# Patient Record
Sex: FEMALE | Race: BLACK OR AFRICAN AMERICAN | URBAN - METROPOLITAN AREA
[De-identification: names, ages, dates, MRNs, and addresses within clinical notes are randomized per-mention and may not be internally consistent; named-entity substitution may affect disease eponyms.]

---

## 2020-07-25 ENCOUNTER — TELEPHONE ENCOUNTER (OUTPATIENT)
Dept: URBAN - METROPOLITAN AREA CLINIC 13 | Facility: CLINIC | Age: 75
End: 2020-07-25

## 2020-07-25 RX ORDER — ALPRAZOLAM 1 MG/1
TAKE 1 TABLET 3 TIMES DAILY AS NEEDED TABLET ORAL
Refills: 0 | OUTPATIENT
Start: 2018-06-20 | End: 2019-12-02

## 2020-07-25 RX ORDER — COLCHICINE 0.6 MG/1
USE AS DIRECTED TABLET, FILM COATED ORAL
Refills: 0 | OUTPATIENT
Start: 2018-06-20 | End: 2019-12-02

## 2020-07-25 RX ORDER — OXYCODONE HYDROCHLORIDE 10 MG/1
TAKE TABLET  PRN TABLET ORAL
Refills: 0 | OUTPATIENT
Start: 2018-06-20 | End: 2020-03-04

## 2020-07-25 RX ORDER — CHOLESTYRAMINE 4 G/9G
TAKE 1 PACKET BY MOUTH ONCE DAILY AS DIRECTED POWDER, FOR SUSPENSION ORAL
Refills: 2 | OUTPATIENT
Start: 2018-06-20 | End: 2019-05-29

## 2020-07-25 RX ORDER — SUCRALFATE 1 G/1
DISSOLVE 1 TABLET IN 1-2 TABLESPOONS OF WATER AND TAKE 30 MINUTES AFTER MEALS AND AT BEDTIME TABLET ORAL
Qty: 120 | Refills: 0 | OUTPATIENT
End: 2019-12-02

## 2020-07-25 RX ORDER — LINACLOTIDE 145 UG/1
CAPSULE, GELATIN COATED ORAL
Refills: 0 | OUTPATIENT
End: 2019-12-02

## 2020-07-26 ENCOUNTER — TELEPHONE ENCOUNTER (OUTPATIENT)
Dept: URBAN - METROPOLITAN AREA CLINIC 13 | Facility: CLINIC | Age: 75
End: 2020-07-26

## 2020-07-26 RX ORDER — HYDROCODONE BITARTRATE AND ACETAMINOPHEN 10; 325 MG/1; MG/1
TABLET ORAL
Qty: 30 | Refills: 0 | Status: ACTIVE | COMMUNITY
Start: 2019-06-26

## 2020-07-26 RX ORDER — ALPRAZOLAM 1 MG/1
TABLET ORAL
Qty: 45 | Refills: 0 | Status: ACTIVE | COMMUNITY
Start: 2018-12-05

## 2020-07-26 RX ORDER — BRIMONIDINE TARTRATE 1 MG/ML
SOLUTION/ DROPS OPHTHALMIC
Qty: 10 | Refills: 0 | Status: ACTIVE | COMMUNITY
Start: 2019-07-18

## 2020-07-26 RX ORDER — LATANOPROST/PF 0.005 %
DROPS OPHTHALMIC (EYE)
Qty: 2 | Refills: 0 | Status: ACTIVE | COMMUNITY
Start: 2019-09-28

## 2020-07-26 RX ORDER — AZITHROMYCIN DIHYDRATE 250 MG/1
TABLET, FILM COATED ORAL
Qty: 6 | Refills: 0 | Status: ACTIVE | COMMUNITY
Start: 2018-11-14

## 2020-07-26 RX ORDER — ROSUVASTATIN CALCIUM 10 MG
TAKE 1 TABLET DAILY TABLET ORAL
Refills: 0 | Status: ACTIVE | COMMUNITY

## 2020-07-26 RX ORDER — FEBUXOSTAT 40 MG/1
TAKE 1 TABLET DAILY TABLET ORAL
Refills: 0 | Status: ACTIVE | COMMUNITY
Start: 2018-06-20

## 2020-07-26 RX ORDER — TRAMADOL HYDROCHLORIDE 50 MG/1
TABLET ORAL
Qty: 7 | Refills: 0 | Status: ACTIVE | COMMUNITY
Start: 2018-11-14

## 2020-07-26 RX ORDER — HYDROCODONE BITARTRATE AND ACETAMINOPHEN 5; 325 MG/1; MG/1
TABLET ORAL
Qty: 10 | Refills: 0 | Status: ACTIVE | COMMUNITY
Start: 2018-11-06

## 2020-07-26 RX ORDER — LATANOPROST/PF 0.005 %
DROPS OPHTHALMIC (EYE)
Qty: 2 | Refills: 0 | Status: ACTIVE | COMMUNITY
Start: 2019-07-17

## 2020-07-26 RX ORDER — TRIAMTERENE AND HYDROCHLOROTHIAZIDE 37.5; 25 MG/1; MG/1
CAPSULE ORAL
Qty: 90 | Refills: 0 | Status: ACTIVE | COMMUNITY
Start: 2019-04-12

## 2020-07-26 RX ORDER — TRIAMTERENE AND HYDROCHLOROTHIAZIDE 37.5; 25 MG/1; MG/1
TAKE 1 TABLET DAILY TABLET ORAL
Refills: 0 | Status: ACTIVE | COMMUNITY
Start: 2018-06-20

## 2020-07-26 RX ORDER — DIPHENHYDRAMINE HYDROCHLORIDE 25 MG/1
TAKE WITH THE OXYCODONE TO ASSIST WITH THE ALLERGIC REACTION TABLET, FILM COATED ORAL
Refills: 0 | Status: ACTIVE | COMMUNITY

## 2020-07-26 RX ORDER — DULOXETINE 30 MG/1
CAPSULE, DELAYED RELEASE PELLETS ORAL
Qty: 45 | Refills: 0 | Status: ACTIVE | COMMUNITY
Start: 2018-12-05

## 2020-07-26 RX ORDER — ROSUVASTATIN CALCIUM 20 MG/1
TABLET, FILM COATED ORAL
Qty: 90 | Refills: 0 | Status: ACTIVE | COMMUNITY
Start: 2019-08-22

## 2020-07-26 RX ORDER — FLUTICASONE PROPIONATE 50 UG/1
SPRAY, METERED NASAL
Qty: 16 | Refills: 0 | Status: ACTIVE | COMMUNITY
Start: 2019-04-10

## 2020-07-26 RX ORDER — TIZANIDINE 4 MG/1
TABLET ORAL
Qty: 180 | Refills: 0 | Status: ACTIVE | COMMUNITY
Start: 2019-10-28

## 2020-07-26 RX ORDER — PREDNISOLONE ACETATE 10 MG/ML
SUSPENSION/ DROPS OPHTHALMIC
Qty: 5 | Refills: 0 | Status: ACTIVE | COMMUNITY
Start: 2019-12-13

## 2020-07-26 RX ORDER — BEPOTASTINE BESILATE 15 MG/ML
SOLUTION/ DROPS OPHTHALMIC
Qty: 10 | Refills: 0 | Status: ACTIVE | COMMUNITY
Start: 2019-09-28

## 2020-07-26 RX ORDER — ALBUTEROL SULFATE 90 UG/1
AEROSOL, METERED RESPIRATORY (INHALATION)
Qty: 36 | Refills: 0 | Status: ACTIVE | COMMUNITY
Start: 2018-11-14

## 2020-07-26 RX ORDER — TIZANIDINE HYDROCHLORIDE 6 MG/1
CAPSULE ORAL
Qty: 15 | Refills: 0 | Status: ACTIVE | COMMUNITY
Start: 2019-02-19

## 2020-07-26 RX ORDER — BUSPIRONE HYDROCHLORIDE 10 MG/1
TABLET ORAL
Qty: 60 | Refills: 0 | Status: ACTIVE | COMMUNITY
Start: 2019-05-22

## 2020-07-26 RX ORDER — COLCHICINE 0.6 MG/1
TABLET, FILM COATED ORAL
Qty: 30 | Refills: 0 | Status: ACTIVE | COMMUNITY
Start: 2018-06-20

## 2020-07-26 RX ORDER — METOPROLOL TARTRATE 25 MG/1
TABLET, FILM COATED ORAL
Qty: 90 | Refills: 0 | Status: ACTIVE | COMMUNITY
Start: 2019-08-07

## 2020-07-26 RX ORDER — ESTRADIOL 1 MG/1
TABLET ORAL
Qty: 30 | Refills: 0 | Status: ACTIVE | COMMUNITY
Start: 2018-06-20

## 2020-07-26 RX ORDER — ALPRAZOLAM 1 MG/1
TABLET ORAL
Qty: 45 | Refills: 0 | Status: ACTIVE | COMMUNITY
Start: 2018-07-23

## 2020-07-26 RX ORDER — TRIAMTERENE AND HYDROCHLOROTHIAZIDE 37.5; 25 MG/1; MG/1
CAPSULE ORAL
Qty: 90 | Refills: 0 | Status: ACTIVE | COMMUNITY
Start: 2019-01-02

## 2020-07-26 RX ORDER — BUSPIRONE HYDROCHLORIDE 10 MG/1
TABLET ORAL
Qty: 60 | Refills: 0 | Status: ACTIVE | COMMUNITY
Start: 2019-08-18

## 2020-07-26 RX ORDER — TIZANIDINE 4 MG/1
TABLET ORAL
Qty: 180 | Refills: 0 | Status: ACTIVE | COMMUNITY
Start: 2018-12-05

## 2020-07-26 RX ORDER — CHOLESTYRAMINE 4 G/9G
POWDER, FOR SUSPENSION ORAL
Qty: 60 | Refills: 0 | Status: ACTIVE | COMMUNITY
Start: 2018-06-20

## 2020-07-26 RX ORDER — OLOPATADINE HYDROCHLORIDE 1 MG/ML
SOLUTION/ DROPS OPHTHALMIC
Qty: 5 | Refills: 0 | Status: ACTIVE | COMMUNITY
Start: 2020-02-03

## 2020-07-26 RX ORDER — OXYCODONE HYDROCHLORIDE 10 MG/1
TABLET ORAL
Qty: 60 | Refills: 0 | Status: ACTIVE | COMMUNITY
Start: 2019-04-04

## 2020-07-26 RX ORDER — PREDNISOLONE ACETATE 10 MG/ML
SUSPENSION/ DROPS OPHTHALMIC
Qty: 5 | Refills: 0 | Status: ACTIVE | COMMUNITY
Start: 2019-09-09

## 2020-07-26 RX ORDER — FLUTICASONE PROPIONATE 50 UG/1
USE 1 SPRAY IN EACH NOSTRIL ONCE DAILY SPRAY, METERED NASAL
Refills: 0 | Status: ACTIVE | COMMUNITY

## 2020-07-26 RX ORDER — BENZONATATE 100 MG/1
CAPSULE ORAL
Qty: 21 | Refills: 0 | Status: ACTIVE | COMMUNITY
Start: 2018-11-14

## 2020-07-26 RX ORDER — CEPHALEXIN 500 MG/1
CAPSULE ORAL
Qty: 10 | Refills: 0 | Status: ACTIVE | COMMUNITY
Start: 2018-11-06

## 2020-07-26 RX ORDER — BUDESONIDE AND FORMOTEROL FUMARATE DIHYDRATE 80; 4.5 UG/1; UG/1
AEROSOL RESPIRATORY (INHALATION)
Qty: 30 | Refills: 0 | Status: ACTIVE | COMMUNITY
Start: 2019-05-10

## 2020-07-26 RX ORDER — BACLOFEN 10 MG/1
TAKE 1 TABLET 3 TIMES DAILY TABLET ORAL
Refills: 0 | Status: ACTIVE | COMMUNITY

## 2020-07-26 RX ORDER — BUSPIRONE HYDROCHLORIDE 10 MG/1
TABLET ORAL
Qty: 60 | Refills: 0 | Status: ACTIVE | COMMUNITY
Start: 2019-07-08

## 2020-07-26 RX ORDER — FENOFIBRATE 145 MG/1
TABLET, FILM COATED ORAL
Qty: 90 | Refills: 0 | Status: ACTIVE | COMMUNITY
Start: 2018-06-25

## 2020-07-26 RX ORDER — TIOTROPIUM BROMIDE 18 UG/1
CAPSULE ORAL; RESPIRATORY (INHALATION)
Qty: 30 | Refills: 0 | Status: ACTIVE | COMMUNITY
Start: 2019-05-09

## 2020-07-26 RX ORDER — OXYCODONE HYDROCHLORIDE 5 MG/1
TABLET ORAL
Qty: 10 | Refills: 0 | Status: ACTIVE | COMMUNITY
Start: 2019-02-19

## 2020-07-26 RX ORDER — FAMOTIDINE 40 MG/1
TAKE 1 TABLET BY MOUTH TWICE A DAY 30 MINUTES BEFORE BREAKFAST AND DINNER TABLET ORAL
Qty: 60 | Refills: 11 | Status: ACTIVE | COMMUNITY
Start: 2019-12-02

## 2020-07-26 RX ORDER — TIOTROPIUM BROMIDE 18 UG/1
CAPSULE ORAL; RESPIRATORY (INHALATION)
Qty: 30 | Refills: 0 | Status: ACTIVE | COMMUNITY
Start: 2019-05-10

## 2020-07-26 RX ORDER — COLCHICINE 0.6 MG/1
TAKE 1 TABLET DAILY AS DIRECTED TABLET, FILM COATED ORAL
Refills: 0 | Status: ACTIVE | COMMUNITY

## 2020-07-26 RX ORDER — HYDROXYZINE HYDROCHLORIDE 25 MG/1
TABLET, FILM COATED ORAL
Qty: 90 | Refills: 0 | Status: ACTIVE | COMMUNITY
Start: 2018-06-20

## 2020-07-26 RX ORDER — FLUTICASONE PROPIONATE 50 UG/1
SPRAY, METERED NASAL
Qty: 16 | Refills: 0 | Status: ACTIVE | COMMUNITY
Start: 2019-01-08

## 2020-07-26 RX ORDER — OXYCODONE HYDROCHLORIDE 10 MG/1
TABLET ORAL
Qty: 30 | Refills: 0 | Status: ACTIVE | COMMUNITY
Start: 2019-01-02

## 2020-07-26 RX ORDER — BACLOFEN 10 MG/1
TABLET ORAL
Qty: 60 | Refills: 0 | Status: ACTIVE | COMMUNITY
Start: 2019-06-26

## 2020-07-26 RX ORDER — FLUCONAZOLE 200 MG/1
TABLET ORAL
Qty: 7 | Refills: 0 | Status: ACTIVE | COMMUNITY
Start: 2019-04-10

## 2020-07-26 RX ORDER — SUCRALFATE 1 G/1
TABLET ORAL
Qty: 90 | Refills: 0 | Status: ACTIVE | COMMUNITY
Start: 2018-08-01

## 2020-07-26 RX ORDER — LIDOCAINE 5 G/100G
OINTMENT TOPICAL
Qty: 35 | Refills: 0 | Status: ACTIVE | COMMUNITY
Start: 2018-06-20

## 2020-07-26 RX ORDER — COLCHICINE 0.6 MG/1
TABLET, FILM COATED ORAL
Qty: 90 | Refills: 0 | Status: ACTIVE | COMMUNITY
Start: 2019-01-02

## 2020-07-26 RX ORDER — OXYCODONE AND ACETAMINOPHEN 7.5; 325 MG/1; MG/1
TABLET ORAL
Qty: 12 | Refills: 0 | Status: ACTIVE | COMMUNITY
Start: 2018-07-23

## 2020-07-26 RX ORDER — ALBUTEROL SULFATE 90 UG/1
INHALE 1 PUFF EVERY 4 HOURS AS NEEDED AEROSOL, METERED RESPIRATORY (INHALATION)
Refills: 0 | Status: ACTIVE | COMMUNITY
Start: 2018-06-20

## 2020-07-26 RX ORDER — NITROFURANTOIN MONOHYDRATE/MACROCRYSTALLINE 25; 75 MG/1; MG/1
CAPSULE ORAL
Qty: 10 | Refills: 0 | Status: ACTIVE | COMMUNITY
Start: 2018-11-14

## 2020-07-26 RX ORDER — AMOXICILLIN AND CLAVULANATE POTASSIUM 875; 125 MG/1; MG/1
TABLET, FILM COATED ORAL
Qty: 20 | Refills: 0 | Status: ACTIVE | COMMUNITY
Start: 2018-07-23

## 2020-10-12 ENCOUNTER — OFFICE VISIT (OUTPATIENT)
Dept: URBAN - METROPOLITAN AREA CLINIC 113 | Facility: CLINIC | Age: 75
End: 2020-10-12
Payer: COMMERCIAL

## 2020-10-12 VITALS — WEIGHT: 250 LBS | RESPIRATION RATE: 18 BRPM | HEIGHT: 62 IN | BODY MASS INDEX: 46.01 KG/M2

## 2020-10-12 DIAGNOSIS — K21.9 GERD WITHOUT ESOPHAGITIS: ICD-10-CM

## 2020-10-12 DIAGNOSIS — K59.09 CHRONIC CONSTIPATION: ICD-10-CM

## 2020-10-12 DIAGNOSIS — K64.8 INTERNAL HEMORRHOID, BLEEDING: ICD-10-CM

## 2020-10-12 PROCEDURE — G8417 CALC BMI ABV UP PARAM F/U: HCPCS | Performed by: INTERNAL MEDICINE

## 2020-10-12 PROCEDURE — 1036F TOBACCO NON-USER: CPT | Performed by: INTERNAL MEDICINE

## 2020-10-12 PROCEDURE — 99213 OFFICE O/P EST LOW 20 MIN: CPT | Performed by: INTERNAL MEDICINE

## 2020-10-12 PROCEDURE — G9903 PT SCRN TBCO ID AS NON USER: HCPCS | Performed by: INTERNAL MEDICINE

## 2020-10-12 PROCEDURE — G8427 DOCREV CUR MEDS BY ELIG CLIN: HCPCS | Performed by: INTERNAL MEDICINE

## 2020-10-12 RX ORDER — DIPHENHYDRAMINE HYDROCHLORIDE 25 MG/1
TAKE WITH THE OXYCODONE TO ASSIST WITH THE ALLERGIC REACTION TABLET, FILM COATED ORAL
Refills: 0 | Status: DISCONTINUED | COMMUNITY

## 2020-10-12 RX ORDER — HYDROCODONE BITARTRATE AND ACETAMINOPHEN 10; 325 MG/1; MG/1
TABLET ORAL
Qty: 30 | Refills: 0 | Status: DISCONTINUED | COMMUNITY
Start: 2019-06-26

## 2020-10-12 RX ORDER — METOPROLOL TARTRATE 25 MG/1
TABLET, FILM COATED ORAL
Qty: 90 | Refills: 0 | Status: DISCONTINUED | COMMUNITY
Start: 2019-08-07

## 2020-10-12 RX ORDER — FENOFIBRATE 145 MG/1
TABLET, FILM COATED ORAL
Qty: 90 | Refills: 0 | Status: DISCONTINUED | COMMUNITY
Start: 2018-06-25

## 2020-10-12 RX ORDER — FLUCONAZOLE 200 MG/1
TABLET ORAL
Qty: 7 | Refills: 0 | Status: DISCONTINUED | COMMUNITY
Start: 2019-04-10

## 2020-10-12 RX ORDER — AMOXICILLIN AND CLAVULANATE POTASSIUM 875; 125 MG/1; MG/1
TABLET, FILM COATED ORAL
Qty: 20 | Refills: 0 | Status: DISCONTINUED | COMMUNITY
Start: 2018-07-23

## 2020-10-12 RX ORDER — ROSUVASTATIN CALCIUM 20 MG/1
TABLET, FILM COATED ORAL
Qty: 90 | Refills: 0 | Status: DISCONTINUED | COMMUNITY
Start: 2019-08-22

## 2020-10-12 RX ORDER — LATANOPROST/PF 0.005 %
1 DROP INTO AFFECTED EYE IN THE EVENING DROPS OPHTHALMIC (EYE) ONCE A DAY
Refills: 0 | Status: ACTIVE | COMMUNITY
Start: 2019-07-17

## 2020-10-12 RX ORDER — SUCRALFATE 1 G/1
TABLET ORAL
Qty: 90 | Refills: 0 | Status: DISCONTINUED | COMMUNITY
Start: 2018-08-01

## 2020-10-12 RX ORDER — BUDESONIDE AND FORMOTEROL FUMARATE DIHYDRATE 160; 4.5 UG/1; UG/1
2 PUFFS AEROSOL RESPIRATORY (INHALATION) TWICE A DAY
Refills: 0 | Status: ACTIVE | COMMUNITY
Start: 2019-05-10

## 2020-10-12 RX ORDER — FAMOTIDINE 40 MG/1
TAKE 1 TABLET BY MOUTH TWICE A DAY 30 MINUTES BEFORE BREAKFAST AND DINNER TABLET ORAL
Qty: 60 | Refills: 11 | Status: DISCONTINUED | COMMUNITY
Start: 2019-12-02

## 2020-10-12 RX ORDER — DULOXETINE 30 MG/1
CAPSULE, DELAYED RELEASE PELLETS ORAL
Qty: 45 | Refills: 0 | Status: DISCONTINUED | COMMUNITY
Start: 2018-12-05

## 2020-10-12 RX ORDER — ESTRADIOL 1 MG/1
TABLET ORAL
Qty: 30 | Refills: 0 | Status: DISCONTINUED | COMMUNITY
Start: 2018-06-20

## 2020-10-12 RX ORDER — TIZANIDINE HYDROCHLORIDE 6 MG/1
CAPSULE ORAL
Qty: 15 | Refills: 0 | Status: DISCONTINUED | COMMUNITY
Start: 2019-02-19

## 2020-10-12 RX ORDER — COLCHICINE 0.6 MG/1
TABLET, FILM COATED ORAL
Qty: 30 | Refills: 0 | Status: DISCONTINUED | COMMUNITY
Start: 2018-06-20

## 2020-10-12 RX ORDER — ROSUVASTATIN CALCIUM 10 MG
1 TABLET TABLET ORAL ONCE A DAY
Refills: 0 | Status: ACTIVE | COMMUNITY

## 2020-10-12 RX ORDER — ALPRAZOLAM 1 MG/1
1 TABLET TABLET ORAL AT BEDTIME
Refills: 0 | Status: ACTIVE | COMMUNITY
Start: 2018-07-23

## 2020-10-12 RX ORDER — OMEPRAZOLE 20 MG/1
1-2 CAPSULE 30 MINUTES BEFORE MORNING MEAL CAPSULE, DELAYED RELEASE ORAL ONCE A DAY
Qty: 180 | Refills: 2 | OUTPATIENT
Start: 2020-10-12

## 2020-10-12 RX ORDER — ALBUTEROL SULFATE 90 UG/1
AEROSOL, METERED RESPIRATORY (INHALATION)
Qty: 36 | Refills: 0 | Status: DISCONTINUED | COMMUNITY
Start: 2018-11-14

## 2020-10-12 RX ORDER — NITROFURANTOIN MONOHYDRATE/MACROCRYSTALLINE 25; 75 MG/1; MG/1
CAPSULE ORAL
Qty: 10 | Refills: 0 | Status: DISCONTINUED | COMMUNITY
Start: 2018-11-14

## 2020-10-12 RX ORDER — LIDOCAINE 5 G/100G
OINTMENT TOPICAL
Qty: 35 | Refills: 0 | Status: DISCONTINUED | COMMUNITY
Start: 2018-06-20

## 2020-10-12 RX ORDER — HYDROCODONE BITARTRATE AND ACETAMINOPHEN 5; 325 MG/1; MG/1
TABLET ORAL
Qty: 10 | Refills: 0 | Status: DISCONTINUED | COMMUNITY
Start: 2018-11-06

## 2020-10-12 RX ORDER — HYDROXYZINE HYDROCHLORIDE 25 MG/1
TABLET, FILM COATED ORAL
Qty: 90 | Refills: 0 | Status: DISCONTINUED | COMMUNITY
Start: 2018-06-20

## 2020-10-12 RX ORDER — TRIAMTERENE AND HYDROCHLOROTHIAZIDE 37.5; 25 MG/1; MG/1
TAKE 1 TABLET DAILY TABLET ORAL
Refills: 0 | Status: DISCONTINUED | COMMUNITY
Start: 2018-06-20

## 2020-10-12 RX ORDER — BEPOTASTINE BESILATE 15 MG/ML
SOLUTION/ DROPS OPHTHALMIC
Qty: 10 | Refills: 0 | Status: DISCONTINUED | COMMUNITY
Start: 2019-09-28

## 2020-10-12 RX ORDER — ALBUTEROL SULFATE 90 UG/1
INHALE 1 PUFF EVERY 4 HOURS AS NEEDED AEROSOL, METERED RESPIRATORY (INHALATION)
Refills: 0 | Status: ACTIVE | COMMUNITY
Start: 2018-06-20

## 2020-10-12 RX ORDER — BRIMONIDINE TARTRATE 1 MG/ML
SOLUTION/ DROPS OPHTHALMIC
Qty: 10 | Refills: 0 | Status: DISCONTINUED | COMMUNITY
Start: 2019-07-18

## 2020-10-12 RX ORDER — COLCHICINE 0.6 MG/1
1 TABLET TABLET, FILM COATED ORAL ONCE A DAY
Refills: 0 | Status: ACTIVE | COMMUNITY

## 2020-10-12 RX ORDER — TRIAMTERENE AND HYDROCHLOROTHIAZIDE 37.5; 25 MG/1; MG/1
CAPSULE ORAL
Qty: 90 | Refills: 0 | Status: DISCONTINUED | COMMUNITY
Start: 2019-01-02

## 2020-10-12 RX ORDER — PREDNISOLONE ACETATE 10 MG/ML
SUSPENSION/ DROPS OPHTHALMIC
Qty: 5 | Refills: 0 | Status: DISCONTINUED | COMMUNITY
Start: 2019-09-09

## 2020-10-12 RX ORDER — OXYCODONE HYDROCHLORIDE 10 MG/1
TABLET ORAL
Qty: 30 | Refills: 0 | Status: DISCONTINUED | COMMUNITY
Start: 2019-01-02

## 2020-10-12 RX ORDER — OXYCODONE HYDROCHLORIDE 5 MG/1
TABLET ORAL
Qty: 10 | Refills: 0 | Status: DISCONTINUED | COMMUNITY
Start: 2019-02-19

## 2020-10-12 RX ORDER — BUSPIRONE HYDROCHLORIDE 10 MG/1
1 TABLET TABLET ORAL TWICE A DAY
Refills: 0 | Status: ACTIVE | COMMUNITY
Start: 2019-05-22

## 2020-10-12 RX ORDER — CEPHALEXIN 500 MG/1
CAPSULE ORAL
Qty: 10 | Refills: 0 | Status: DISCONTINUED | COMMUNITY
Start: 2018-11-06

## 2020-10-12 RX ORDER — TIOTROPIUM BROMIDE 18 UG/1
1 CAPSULE BY INHALING THE CONTENTS OF THE CAPSULE USING THE HANDIHALER DEVICE CAPSULE ORAL; RESPIRATORY (INHALATION) ONCE A DAY
Refills: 0 | Status: ACTIVE | COMMUNITY
Start: 2019-05-09

## 2020-10-12 RX ORDER — TIZANIDINE 4 MG/1
1 TABLET AS NEEDED TABLET ORAL THREE TIMES A DAY
Refills: 0 | Status: DISCONTINUED | COMMUNITY
Start: 2018-12-05

## 2020-10-12 RX ORDER — AZITHROMYCIN DIHYDRATE 250 MG/1
TABLET, FILM COATED ORAL
Qty: 6 | Refills: 0 | Status: DISCONTINUED | COMMUNITY
Start: 2018-11-14

## 2020-10-12 RX ORDER — FEBUXOSTAT 40 MG/1
1 TABLET TABLET ORAL ONCE A DAY
Refills: 0 | Status: ACTIVE | COMMUNITY
Start: 2018-06-20

## 2020-10-12 RX ORDER — OLOPATADINE HYDROCHLORIDE 1 MG/ML
1 DROP INTO AFFECTED EYE SOLUTION/ DROPS OPHTHALMIC ONCE A DAY
Refills: 0 | Status: ACTIVE | COMMUNITY
Start: 2020-02-03

## 2020-10-12 RX ORDER — TRAMADOL HYDROCHLORIDE 50 MG/1
TABLET ORAL
Qty: 7 | Refills: 0 | Status: DISCONTINUED | COMMUNITY
Start: 2018-11-14

## 2020-10-12 RX ORDER — OXYCODONE AND ACETAMINOPHEN 7.5; 325 MG/1; MG/1
TABLET ORAL
Qty: 12 | Refills: 0 | Status: DISCONTINUED | COMMUNITY
Start: 2018-07-23

## 2020-10-12 RX ORDER — BACLOFEN 10 MG/1
1 TABLET WITH FOOD OR MILK TABLET ORAL
Refills: 0 | Status: ACTIVE | COMMUNITY

## 2020-10-12 RX ORDER — FLUTICASONE PROPIONATE 50 UG/1
USE 1 SPRAY IN EACH NOSTRIL ONCE DAILY SPRAY, METERED NASAL
Refills: 0 | Status: ACTIVE | COMMUNITY

## 2020-10-12 RX ORDER — CHOLESTYRAMINE 4 G/9G
POWDER, FOR SUSPENSION ORAL
Qty: 60 | Refills: 0 | Status: DISCONTINUED | COMMUNITY
Start: 2018-06-20

## 2020-10-12 RX ORDER — BENZONATATE 100 MG/1
CAPSULE ORAL
Qty: 21 | Refills: 0 | Status: DISCONTINUED | COMMUNITY
Start: 2018-11-14

## 2020-10-12 NOTE — HPI-OTHER HISTORIES
Ms. SHAIKH is a 75 year old female with a history of morbid obesity, irritable bowel syndrome and GERD, presenting for follow-up.  She was last seen March 4th.  She was hospitalized in 2019 for hematochezia and lower GI bleed, suspected to be diverticular in origin.  She is having a daily bowel movement with MiraLAX, with occasional small volume red blood per rectum with wiping. She is using Benefiber or Metamucil.   She is no longer on an acid reflux medications or carafate.  She can no longer get Pepcid as it in on backorder.  She has been having frequent heartburn symptoms.  She is trying to diet and lose weight.  She denies abdominal pain, nausea, vomiting, change in bowel habits or blood in the stool.    Review of record Colonoscopy on 11/16/19 with Dr. Quiroz was notable for removal of a 3mm mucosal polyp in the descending colon, moderate pancolonic diverticulosis, moderate internal and external hemorrhoids.

## 2020-10-15 ENCOUNTER — TELEPHONE ENCOUNTER (OUTPATIENT)
Dept: URBAN - METROPOLITAN AREA CLINIC 113 | Facility: CLINIC | Age: 75
End: 2020-10-15

## 2020-10-20 ENCOUNTER — TELEPHONE ENCOUNTER (OUTPATIENT)
Dept: URBAN - METROPOLITAN AREA CLINIC 113 | Facility: CLINIC | Age: 75
End: 2020-10-20

## 2020-10-20 RX ORDER — FAMOTIDINE 40 MG/1
1 TABLET AT BEDTIME TABLET, FILM COATED ORAL ONCE A DAY
Qty: 90 TABLET | Refills: 1 | OUTPATIENT

## 2021-02-01 ENCOUNTER — TELEPHONE ENCOUNTER (OUTPATIENT)
Dept: URBAN - METROPOLITAN AREA CLINIC 113 | Facility: CLINIC | Age: 76
End: 2021-02-01

## 2021-02-09 ENCOUNTER — OFFICE VISIT (OUTPATIENT)
Dept: URBAN - METROPOLITAN AREA CLINIC 113 | Facility: CLINIC | Age: 76
End: 2021-02-09
Payer: COMMERCIAL

## 2021-02-09 ENCOUNTER — WEB ENCOUNTER (OUTPATIENT)
Dept: URBAN - METROPOLITAN AREA CLINIC 113 | Facility: CLINIC | Age: 76
End: 2021-02-09

## 2021-02-09 VITALS
WEIGHT: 265 LBS | HEIGHT: 62 IN | TEMPERATURE: 97.3 F | SYSTOLIC BLOOD PRESSURE: 124 MMHG | HEART RATE: 73 BPM | DIASTOLIC BLOOD PRESSURE: 72 MMHG | BODY MASS INDEX: 48.76 KG/M2

## 2021-02-09 DIAGNOSIS — K59.09 CHRONIC CONSTIPATION: ICD-10-CM

## 2021-02-09 DIAGNOSIS — R19.7 DIARRHEA, UNSPECIFIED TYPE: ICD-10-CM

## 2021-02-09 DIAGNOSIS — K21.9 GERD WITHOUT ESOPHAGITIS: ICD-10-CM

## 2021-02-09 DIAGNOSIS — K64.8 INTERNAL HEMORRHOID, BLEEDING: ICD-10-CM

## 2021-02-09 PROCEDURE — 99213 OFFICE O/P EST LOW 20 MIN: CPT | Performed by: INTERNAL MEDICINE

## 2021-02-09 RX ORDER — ROSUVASTATIN CALCIUM 5 MG/1
1 TABLET TABLET, FILM COATED ORAL ONCE A DAY
Refills: 0 | Status: ACTIVE | COMMUNITY

## 2021-02-09 RX ORDER — CARVEDILOL 3.12 MG/1
1 TABLET WITH FOOD TABLET, FILM COATED ORAL TWICE A DAY
Status: ACTIVE | COMMUNITY

## 2021-02-09 RX ORDER — DEXAMETHASONE 1 MG/ML
1 DROP INTO AFFECTED EYE SUSPENSION OPHTHALMIC
Status: ACTIVE | COMMUNITY

## 2021-02-09 RX ORDER — ALBUTEROL SULFATE 90 UG/1
INHALE 1 PUFF EVERY 4 HOURS AS NEEDED AEROSOL, METERED RESPIRATORY (INHALATION)
Refills: 0 | Status: ON HOLD | COMMUNITY
Start: 2018-06-20

## 2021-02-09 RX ORDER — PREDNISONE 10 MG/1
1 TABLET TABLET ORAL ONCE A DAY
Status: ACTIVE | COMMUNITY

## 2021-02-09 RX ORDER — FAMOTIDINE 40 MG/1
1 TABLET AT BEDTIME TABLET, FILM COATED ORAL ONCE A DAY
Qty: 90 TABLET | Refills: 1 | Status: ON HOLD | COMMUNITY

## 2021-02-09 RX ORDER — TIOTROPIUM BROMIDE 18 UG/1
1 CAPSULE BY INHALING THE CONTENTS OF THE CAPSULE USING THE HANDIHALER DEVICE CAPSULE ORAL; RESPIRATORY (INHALATION) ONCE A DAY
Refills: 0 | Status: ACTIVE | COMMUNITY
Start: 2019-05-09

## 2021-02-09 RX ORDER — SITAGLIPTIN AND METFORMIN HYDROCHLORIDE 50; 500 MG/1; MG/1
1 TABLET WITH MEALS TABLET, FILM COATED ORAL ONCE DAILY
Status: ACTIVE | COMMUNITY

## 2021-02-09 RX ORDER — ALBUTEROL SULFATE 90 UG/1
1 PUFF AS NEEDED AEROSOL, METERED RESPIRATORY (INHALATION)
Status: ACTIVE | COMMUNITY

## 2021-02-09 RX ORDER — AZELASTINE HYDROCHLORIDE 137 UG/1
2 SPRAYS IN EACH NOSTRIL SPRAY, METERED NASAL TWICE A DAY
Status: ACTIVE | COMMUNITY

## 2021-02-09 RX ORDER — LATANOPROST/PF 0.005 %
1 DROP INTO AFFECTED EYE IN THE EVENING DROPS OPHTHALMIC (EYE) ONCE A DAY
Refills: 0 | Status: ON HOLD | COMMUNITY
Start: 2019-07-17

## 2021-02-09 RX ORDER — BUSPIRONE HYDROCHLORIDE 10 MG/1
1 TABLET TABLET ORAL TWICE A DAY
Refills: 0 | Status: ACTIVE | COMMUNITY
Start: 2019-05-22

## 2021-02-09 RX ORDER — MULTIVITAMIN
1 TABLET TABLET ORAL ONCE A DAY
Status: ACTIVE | COMMUNITY

## 2021-02-09 RX ORDER — TIZANIDINE 4 MG/1
1 TABLET AS NEEDED TABLET ORAL TWICE DAILY
Status: ACTIVE | COMMUNITY

## 2021-02-09 RX ORDER — SOFT LENS DISINFECTANT
AS DIRECTED SOLUTION, NON-ORAL MISCELLANEOUS
Status: ACTIVE | COMMUNITY

## 2021-02-09 RX ORDER — OLOPATADINE HYDROCHLORIDE 1 MG/ML
1 DROP INTO AFFECTED EYE SOLUTION/ DROPS OPHTHALMIC ONCE A DAY
Refills: 0 | Status: ON HOLD | COMMUNITY
Start: 2020-02-03

## 2021-02-09 RX ORDER — ALPRAZOLAM 1 MG/1
1 TABLET TABLET ORAL AT BEDTIME
Refills: 0 | Status: ON HOLD | COMMUNITY
Start: 2018-07-23

## 2021-02-09 RX ORDER — FLUTICASONE PROPIONATE 50 UG/1
USE 1 SPRAY IN EACH NOSTRIL ONCE DAILY SPRAY, METERED NASAL
Refills: 0 | Status: ACTIVE | COMMUNITY

## 2021-02-09 RX ORDER — LIFITEGRAST 50 MG/ML
1 DROP INTO AFFECTED EYE SOLUTION/ DROPS OPHTHALMIC TWICE A DAY
Status: ACTIVE | COMMUNITY

## 2021-02-09 RX ORDER — FEBUXOSTAT 40 MG/1
1 TABLET TABLET ORAL ONCE A DAY
Refills: 0 | Status: ACTIVE | COMMUNITY
Start: 2018-06-20

## 2021-02-09 RX ORDER — COLCHICINE 0.6 MG/1
1 TABLET TABLET, FILM COATED ORAL ONCE A DAY
Refills: 0 | Status: ON HOLD | COMMUNITY

## 2021-02-09 RX ORDER — BACLOFEN 10 MG/1
1 TABLET WITH FOOD OR MILK TABLET ORAL
Refills: 0 | Status: DISCONTINUED | COMMUNITY

## 2021-02-09 RX ORDER — ALPRAZOLAM 1 MG/1
1 TABLET TABLET ORAL ONCE DAILY
Status: ACTIVE | COMMUNITY

## 2021-02-09 RX ORDER — OMEPRAZOLE 20 MG/1
1-2 CAPSULE 30 MINUTES BEFORE MORNING MEAL CAPSULE, DELAYED RELEASE ORAL ONCE A DAY
Qty: 180 | Refills: 2 | Status: ON HOLD | COMMUNITY
Start: 2020-10-12

## 2021-02-09 RX ORDER — BUDESONIDE AND FORMOTEROL FUMARATE DIHYDRATE 160; 4.5 UG/1; UG/1
2 PUFFS AEROSOL RESPIRATORY (INHALATION) TWICE A DAY
Refills: 0 | Status: ACTIVE | COMMUNITY
Start: 2019-05-10

## 2021-02-09 NOTE — HPI-TODAY'S VISIT:
74 y/o female presenting for f/u. She was last seen in the clinic in October 2020. She is here today with a change in her bowel movements. She had ganglion cyst removal from her right hand as well as carpel tunnel surgery. Since the surgery she has been having 6 bm per day. The bowel movements are soft-formed. She did have 2 episodes of streaky blood when wiping but no more. She is afraid to eat anything because she has to have a bowel movement after eating. She was on abx intra-operatively but did not have anymore. She is currenlty on pain medications post-surgery.   She does take metamucil every day and benefiber PRN.   10/12/2020 Ms. SHAIKH is a 75 year old female with a history of morbid obesity, irritable bowel syndrome and GERD, presenting for follow-up.  She was last seen March 4th.  She was hospitalized in 2019 for hematochezia and lower GI bleed, suspected to be diverticular in origin.  She is having a daily bowel movement with MiraLAX, with occasional small volume red blood per rectum with wiping. She is using Benefiber or Metamucil.   She is no longer on an acid reflux medications or carafate.  She can no longer get Pepcid as it in on backorder.  She has been having frequent heartburn symptoms.  She is trying to diet and lose weight.  She denies abdominal pain, nausea, vomiting, change in bowel habits or blood in the stool.    Review of record Colonoscopy on 11/16/19 with Dr. Quiroz was notable for removal of a 3mm mucosal polyp in the descending colon, moderate pancolonic diverticulosis, moderate internal and external hemorrhoids.

## 2021-02-17 ENCOUNTER — OFFICE VISIT (OUTPATIENT)
Dept: URBAN - METROPOLITAN AREA CLINIC 113 | Facility: CLINIC | Age: 76
End: 2021-02-17
Payer: COMMERCIAL

## 2021-02-17 ENCOUNTER — WEB ENCOUNTER (OUTPATIENT)
Dept: URBAN - METROPOLITAN AREA CLINIC 113 | Facility: CLINIC | Age: 76
End: 2021-02-17

## 2021-02-17 VITALS
HEART RATE: 78 BPM | DIASTOLIC BLOOD PRESSURE: 85 MMHG | HEIGHT: 62 IN | TEMPERATURE: 97.7 F | WEIGHT: 165 LBS | BODY MASS INDEX: 30.36 KG/M2 | RESPIRATION RATE: 18 BRPM | SYSTOLIC BLOOD PRESSURE: 127 MMHG

## 2021-02-17 DIAGNOSIS — R19.4 CHANGE IN BOWEL HABITS: ICD-10-CM

## 2021-02-17 DIAGNOSIS — K21.9 GERD WITHOUT ESOPHAGITIS: ICD-10-CM

## 2021-02-17 PROCEDURE — 99213 OFFICE O/P EST LOW 20 MIN: CPT | Performed by: NURSE PRACTITIONER

## 2021-02-17 RX ORDER — FLUTICASONE PROPIONATE 50 UG/1
USE 1 SPRAY IN EACH NOSTRIL ONCE DAILY SPRAY, METERED NASAL
Refills: 0 | Status: ACTIVE | COMMUNITY

## 2021-02-17 RX ORDER — MULTIVITAMIN
1 TABLET TABLET ORAL ONCE A DAY
Status: ACTIVE | COMMUNITY

## 2021-02-17 RX ORDER — SITAGLIPTIN AND METFORMIN HYDROCHLORIDE 50; 500 MG/1; MG/1
1 TABLET WITH MEALS TABLET, FILM COATED ORAL ONCE DAILY
Status: ACTIVE | COMMUNITY

## 2021-02-17 RX ORDER — BUSPIRONE HYDROCHLORIDE 10 MG/1
1 TABLET TABLET ORAL TWICE A DAY
Refills: 0 | Status: ACTIVE | COMMUNITY
Start: 2019-05-22

## 2021-02-17 RX ORDER — COLCHICINE 0.6 MG/1
1 TABLET TABLET, FILM COATED ORAL ONCE A DAY
Refills: 0 | Status: ACTIVE | COMMUNITY

## 2021-02-17 RX ORDER — DEXAMETHASONE 1 MG/ML
1 DROP INTO AFFECTED EYE SUSPENSION OPHTHALMIC
Status: ACTIVE | COMMUNITY

## 2021-02-17 RX ORDER — AZELASTINE HYDROCHLORIDE 137 UG/1
2 SPRAYS IN EACH NOSTRIL SPRAY, METERED NASAL TWICE A DAY
Status: ACTIVE | COMMUNITY

## 2021-02-17 RX ORDER — ALBUTEROL SULFATE 90 UG/1
INHALE 1 PUFF EVERY 4 HOURS AS NEEDED AEROSOL, METERED RESPIRATORY (INHALATION)
Refills: 0 | Status: ACTIVE | COMMUNITY
Start: 2018-06-20

## 2021-02-17 RX ORDER — CARVEDILOL 3.12 MG/1
1 TABLET WITH FOOD TABLET, FILM COATED ORAL TWICE A DAY
Status: ACTIVE | COMMUNITY

## 2021-02-17 RX ORDER — LIFITEGRAST 50 MG/ML
1 DROP INTO AFFECTED EYE SOLUTION/ DROPS OPHTHALMIC TWICE A DAY
Status: ACTIVE | COMMUNITY

## 2021-02-17 RX ORDER — BUDESONIDE AND FORMOTEROL FUMARATE DIHYDRATE 160; 4.5 UG/1; UG/1
2 PUFFS AEROSOL RESPIRATORY (INHALATION) TWICE A DAY
Refills: 0 | Status: ACTIVE | COMMUNITY
Start: 2019-05-10

## 2021-02-17 RX ORDER — FEBUXOSTAT 40 MG/1
1 TABLET TABLET ORAL ONCE A DAY
Refills: 0 | Status: ACTIVE | COMMUNITY
Start: 2018-06-20

## 2021-02-17 RX ORDER — LATANOPROST/PF 0.005 %
1 DROP INTO AFFECTED EYE IN THE EVENING DROPS OPHTHALMIC (EYE) ONCE A DAY
Refills: 0 | Status: ACTIVE | COMMUNITY
Start: 2019-07-17

## 2021-02-17 RX ORDER — TIOTROPIUM BROMIDE 18 UG/1
1 CAPSULE BY INHALING THE CONTENTS OF THE CAPSULE USING THE HANDIHALER DEVICE CAPSULE ORAL; RESPIRATORY (INHALATION) ONCE A DAY
Refills: 0 | Status: ACTIVE | COMMUNITY
Start: 2019-05-09

## 2021-02-17 RX ORDER — HYDROCODONE BITARTRATE AND ACETAMINOPHEN 5; 325 MG/1; MG/1
1 TABLET AS NEEDED TABLET ORAL
Status: ACTIVE | COMMUNITY

## 2021-02-17 RX ORDER — OMEPRAZOLE 20 MG/1
1-2 CAPSULE 30 MINUTES BEFORE MORNING MEAL CAPSULE, DELAYED RELEASE ORAL ONCE A DAY
Qty: 180 | Refills: 2 | Status: ACTIVE | COMMUNITY
Start: 2020-10-12

## 2021-02-17 RX ORDER — ALPRAZOLAM 1 MG/1
1 TABLET TABLET ORAL ONCE DAILY
Status: ACTIVE | COMMUNITY

## 2021-02-17 RX ORDER — TIZANIDINE 4 MG/1
1 TABLET AS NEEDED TABLET ORAL TWICE DAILY
Status: ACTIVE | COMMUNITY

## 2021-02-17 RX ORDER — FAMOTIDINE 40 MG/1
1 TABLET AT BEDTIME TABLET, FILM COATED ORAL ONCE A DAY
Qty: 30 | OUTPATIENT
Start: 2021-02-17

## 2021-02-17 NOTE — HPI-TODAY'S VISIT:
This is a 75-year-old female with a history of morbid obesity, irritable bowel syndrome, GERD, and colon diverticulosis presenting for follow-up.  She was last seen 2/9/2021.  She had undergone orthopedic surgery and was having 6 bowel movements per day.  Her bowel movements were soft/formed.  She reported 2 episodes of small-volume blood on the tissue after bowel movements.  Stool studies were ordered.  A fiber supplement and probiotics were considerations. labs 2/10/2020 : Stool for C. difficile negative.  Stool for O&P and culture were not resulted. She returned 4 specimen containers to the lab.  She has mild she continues to have as many as 4 very soft bowel movements per day associated with gas and urgency to defecate.  Cramps and pain in the left lower quadrant occurring when she needs to have a bowel movement.  There has been no red blood on the tissue or in the stool since her last visit.  She was on Pepcid 40 mg twice a day.  She stopped taking it last week.  She has been experiencing heartburn, gas, and indigestion.  She is taking gas medication which has provided some benefit.  Prior to her surgery, she was having a normal, daily bowel movement.  She was taking fiber daily but has not taken this recently.  She denies any other abdominal symptoms.

## 2021-04-15 ENCOUNTER — OFFICE VISIT (OUTPATIENT)
Dept: URBAN - METROPOLITAN AREA CLINIC 113 | Facility: CLINIC | Age: 76
End: 2021-04-15

## 2021-05-24 ENCOUNTER — OFFICE VISIT (OUTPATIENT)
Dept: URBAN - METROPOLITAN AREA CLINIC 113 | Facility: CLINIC | Age: 76
End: 2021-05-24
Payer: COMMERCIAL

## 2021-05-24 VITALS
TEMPERATURE: 97.3 F | WEIGHT: 263 LBS | HEIGHT: 62 IN | SYSTOLIC BLOOD PRESSURE: 159 MMHG | HEART RATE: 80 BPM | RESPIRATION RATE: 22 BRPM | DIASTOLIC BLOOD PRESSURE: 83 MMHG | BODY MASS INDEX: 48.4 KG/M2

## 2021-05-24 DIAGNOSIS — R14.3 EXCESSIVE GAS: ICD-10-CM

## 2021-05-24 DIAGNOSIS — K64.8 INTERNAL HEMORRHOID, BLEEDING: ICD-10-CM

## 2021-05-24 DIAGNOSIS — R19.4 CHANGE IN BOWEL HABITS: ICD-10-CM

## 2021-05-24 DIAGNOSIS — K58.1 IRRITABLE BOWEL SYNDROME WITH CONSTIPATION: ICD-10-CM

## 2021-05-24 DIAGNOSIS — K21.9 GERD WITHOUT ESOPHAGITIS: ICD-10-CM

## 2021-05-24 DIAGNOSIS — K59.09 CHRONIC CONSTIPATION: ICD-10-CM

## 2021-05-24 PROBLEM — 236069009: Status: ACTIVE | Noted: 2021-05-24

## 2021-05-24 PROBLEM — 271832001 FLATULENCE, ERUCTATION AND GAS PAIN: Status: ACTIVE | Noted: 2021-05-24

## 2021-05-24 PROBLEM — 129851009: Status: ACTIVE | Noted: 2021-02-17

## 2021-05-24 PROBLEM — 266435005: Status: ACTIVE | Noted: 2020-10-12

## 2021-05-24 PROCEDURE — 99214 OFFICE O/P EST MOD 30 MIN: CPT | Performed by: INTERNAL MEDICINE

## 2021-05-24 RX ORDER — CARVEDILOL 3.12 MG/1
1 TABLET WITH FOOD TABLET, FILM COATED ORAL TWICE A DAY
Status: ACTIVE | COMMUNITY

## 2021-05-24 RX ORDER — SITAGLIPTIN AND METFORMIN HYDROCHLORIDE 50; 500 MG/1; MG/1
1 TABLET WITH MEALS TABLET, FILM COATED ORAL ONCE DAILY
Status: ACTIVE | COMMUNITY

## 2021-05-24 RX ORDER — FLUTICASONE PROPIONATE 50 UG/1
USE 1 SPRAY IN EACH NOSTRIL ONCE DAILY SPRAY, METERED NASAL
Refills: 0 | Status: ACTIVE | COMMUNITY

## 2021-05-24 RX ORDER — FAMOTIDINE 40 MG/1
1 TABLET AT BEDTIME TABLET, FILM COATED ORAL ONCE A DAY
Qty: 30 | Status: ACTIVE | COMMUNITY
Start: 2021-02-17

## 2021-05-24 RX ORDER — BUDESONIDE AND FORMOTEROL FUMARATE DIHYDRATE 160; 4.5 UG/1; UG/1
2 PUFFS AEROSOL RESPIRATORY (INHALATION) TWICE A DAY
Refills: 0 | Status: ACTIVE | COMMUNITY
Start: 2019-05-10

## 2021-05-24 RX ORDER — LATANOPROST/PF 0.005 %
1 DROP INTO AFFECTED EYE IN THE EVENING DROPS OPHTHALMIC (EYE) ONCE A DAY
Refills: 0 | Status: ACTIVE | COMMUNITY
Start: 2019-07-17

## 2021-05-24 RX ORDER — MULTIVITAMIN
1 TABLET TABLET ORAL ONCE A DAY
Status: ACTIVE | COMMUNITY

## 2021-05-24 RX ORDER — OMEPRAZOLE 20 MG/1
1-2 CAPSULE 30 MINUTES BEFORE MORNING MEAL CAPSULE, DELAYED RELEASE ORAL ONCE A DAY
Qty: 180 | Refills: 2 | Status: ACTIVE | COMMUNITY
Start: 2020-10-12

## 2021-05-24 RX ORDER — PSYLLIUM SEED
AS DIRECTED PACKET (EA) ORAL
OUTPATIENT
Start: 2021-05-24

## 2021-05-24 RX ORDER — TIOTROPIUM BROMIDE 18 UG/1
1 CAPSULE BY INHALING THE CONTENTS OF THE CAPSULE USING THE HANDIHALER DEVICE CAPSULE ORAL; RESPIRATORY (INHALATION) ONCE A DAY
Refills: 0 | Status: ACTIVE | COMMUNITY
Start: 2019-05-09

## 2021-05-24 RX ORDER — DEXAMETHASONE 1 MG/ML
1 DROP INTO AFFECTED EYE SUSPENSION OPHTHALMIC
Status: ACTIVE | COMMUNITY

## 2021-05-24 RX ORDER — ALBUTEROL SULFATE 90 UG/1
INHALE 1 PUFF EVERY 4 HOURS AS NEEDED AEROSOL, METERED RESPIRATORY (INHALATION)
Refills: 0 | Status: ACTIVE | COMMUNITY
Start: 2018-06-20

## 2021-05-24 RX ORDER — BUMETANIDE 2 MG/1
AS DIRECTED TABLET ORAL
Status: ACTIVE | COMMUNITY

## 2021-05-24 RX ORDER — ACETAMINOPHEN 500 MG
AS DIRECTED TABLET ORAL
OUTPATIENT
Start: 2021-05-24

## 2021-05-24 RX ORDER — COLCHICINE 0.6 MG/1
1 TABLET TABLET, FILM COATED ORAL ONCE A DAY
Refills: 0 | Status: ACTIVE | COMMUNITY

## 2021-05-24 RX ORDER — LIFITEGRAST 50 MG/ML
1 DROP INTO AFFECTED EYE SOLUTION/ DROPS OPHTHALMIC TWICE A DAY
Status: ACTIVE | COMMUNITY

## 2021-05-24 RX ORDER — AMOXICILLIN AND CLAVULANATE POTASSIUM 875; 125 MG/1; MG/1
1 TABLET TABLET, FILM COATED ORAL
Status: ACTIVE | COMMUNITY

## 2021-05-24 RX ORDER — FEBUXOSTAT 40 MG/1
1 TABLET TABLET ORAL ONCE A DAY
Refills: 0 | Status: ACTIVE | COMMUNITY
Start: 2018-06-20

## 2021-05-24 RX ORDER — HYDROCODONE BITARTRATE AND ACETAMINOPHEN 5; 325 MG/1; MG/1
1 TABLET AS NEEDED TABLET ORAL
Status: ACTIVE | COMMUNITY

## 2021-05-24 RX ORDER — PREDNISONE 50 MG/1
2 TABLETS TABLET ORAL THREE TIMES A DAY
Status: ACTIVE | COMMUNITY

## 2021-05-24 RX ORDER — ALPRAZOLAM 1 MG/1
1 TABLET TABLET ORAL ONCE DAILY
Status: ACTIVE | COMMUNITY

## 2021-05-24 RX ORDER — SPIRONOLACTONE 25 MG/1
1 TABLET TABLET, FILM COATED ORAL
Status: ACTIVE | COMMUNITY

## 2021-05-24 RX ORDER — BUSPIRONE HYDROCHLORIDE 10 MG/1
1 TABLET TABLET ORAL TWICE A DAY
Refills: 0 | Status: ACTIVE | COMMUNITY
Start: 2019-05-22

## 2021-05-24 RX ORDER — AZELASTINE HYDROCHLORIDE 137 UG/1
2 SPRAYS IN EACH NOSTRIL SPRAY, METERED NASAL TWICE A DAY
Status: ACTIVE | COMMUNITY

## 2021-05-24 RX ORDER — TIZANIDINE 4 MG/1
1 TABLET AS NEEDED TABLET ORAL TWICE DAILY
Status: ACTIVE | COMMUNITY

## 2021-05-24 NOTE — HPI-TODAY'S VISIT:
This is a 75-year-old female with a history of morbid obesity, irritable bowel syndrome, GERD, and colon diverticulosis presenting for follow-up.    She had cardiac cath 2 months ago, but did not have any stents placed.   She still has leg swelling, but denies chest pain.  She does have some shortness of breath from her chronic bronchitis and is on Prednisone and inhalers.  Her bowels are moving more regularly, but does have rectal bleeding mainly when wiping or in the toilet water.  She has restarted Metamucil and uses Raul probiotic.  She is having daily bowel movements.  She still has gas and burning. She restarted Pepcid with mild relief and is also on Omeprazole daily.  She has not tried Gaviscon or Gas-X.  She otherwise denies abdominal pain, nausea, vomiting, dysphagia, change in bowel habits or blood in the stool.      labs 2/10/2020 : Stool for C. difficile negative.   Colonoscopy November 2019 revealed a benign 3 mm descending colon polyp, normal terminal ileum, multiple diverticula in the sigmoid and ascending colon, otherwise normal colon and moderate internal/external hemorrhoids.

## 2022-01-14 ENCOUNTER — TELEPHONE ENCOUNTER (OUTPATIENT)
Dept: URBAN - METROPOLITAN AREA CLINIC 113 | Facility: CLINIC | Age: 77
End: 2022-01-14

## 2022-02-01 ENCOUNTER — OFFICE VISIT (OUTPATIENT)
Dept: URBAN - METROPOLITAN AREA CLINIC 113 | Facility: CLINIC | Age: 77
End: 2022-02-01
Payer: COMMERCIAL

## 2022-02-01 VITALS
HEIGHT: 62 IN | SYSTOLIC BLOOD PRESSURE: 156 MMHG | WEIGHT: 262 LBS | HEART RATE: 75 BPM | DIASTOLIC BLOOD PRESSURE: 82 MMHG | BODY MASS INDEX: 48.21 KG/M2 | RESPIRATION RATE: 20 BRPM | TEMPERATURE: 97.7 F

## 2022-02-01 DIAGNOSIS — K64.8 INTERNAL HEMORRHOID, BLEEDING: ICD-10-CM

## 2022-02-01 DIAGNOSIS — K58.1 IRRITABLE BOWEL SYNDROME WITH CONSTIPATION: ICD-10-CM

## 2022-02-01 PROCEDURE — 99213 OFFICE O/P EST LOW 20 MIN: CPT | Performed by: NURSE PRACTITIONER

## 2022-02-01 RX ORDER — ALPRAZOLAM 1 MG/1
1 TABLET TABLET ORAL ONCE DAILY
Status: ACTIVE | COMMUNITY

## 2022-02-01 RX ORDER — HYDROCORTISONE ACETATE 25 MG/1
INSERT 1 SUPPOSITORY AT BEDTIME SUPPOSITORY RECTAL ONCE DAILY
Qty: 14 | Refills: 1 | OUTPATIENT
Start: 2022-02-01 | End: 2022-03-01

## 2022-02-01 RX ORDER — HYDROCODONE BITARTRATE AND ACETAMINOPHEN 5; 325 MG/1; MG/1
1 TABLET AS NEEDED TABLET ORAL
Status: ACTIVE | COMMUNITY

## 2022-02-01 RX ORDER — AMOXICILLIN AND CLAVULANATE POTASSIUM 875; 125 MG/1; MG/1
1 TABLET TABLET, FILM COATED ORAL
Status: DISCONTINUED | COMMUNITY

## 2022-02-01 RX ORDER — DEXAMETHASONE 1 MG/ML
1 DROP INTO AFFECTED EYE SUSPENSION OPHTHALMIC
Status: DISCONTINUED | COMMUNITY

## 2022-02-01 RX ORDER — PSYLLIUM SEED
AS DIRECTED PACKET (EA) ORAL
Status: DISCONTINUED | COMMUNITY
Start: 2021-05-24

## 2022-02-01 RX ORDER — SPIRONOLACTONE 50 MG/1
1 TABLET TABLET, FILM COATED ORAL ONCE A DAY
Status: ACTIVE | COMMUNITY

## 2022-02-01 RX ORDER — TIZANIDINE 4 MG/1
1 TABLET AS NEEDED TABLET ORAL TWICE DAILY
Status: DISCONTINUED | COMMUNITY

## 2022-02-01 RX ORDER — FLUTICASONE PROPIONATE 50 UG/1
USE 1 SPRAY IN EACH NOSTRIL ONCE DAILY SPRAY, METERED NASAL
Refills: 0 | Status: DISCONTINUED | COMMUNITY

## 2022-02-01 RX ORDER — ACETAMINOPHEN 500 MG
AS DIRECTED TABLET ORAL
Status: DISCONTINUED | COMMUNITY
Start: 2021-05-24

## 2022-02-01 RX ORDER — SITAGLIPTIN AND METFORMIN HYDROCHLORIDE 50; 500 MG/1; MG/1
1 TABLET WITH MEALS TABLET, FILM COATED ORAL ONCE DAILY
Status: ACTIVE | COMMUNITY

## 2022-02-01 RX ORDER — TIOTROPIUM BROMIDE 18 UG/1
1 CAPSULE BY INHALING THE CONTENTS OF THE CAPSULE USING THE HANDIHALER DEVICE CAPSULE ORAL; RESPIRATORY (INHALATION) ONCE A DAY
Refills: 0 | Status: DISCONTINUED | COMMUNITY
Start: 2019-05-09

## 2022-02-01 RX ORDER — ALBUTEROL SULFATE 90 UG/1
INHALE 1 PUFF EVERY 4 HOURS AS NEEDED AEROSOL, METERED RESPIRATORY (INHALATION)
Refills: 0 | Status: ACTIVE | COMMUNITY
Start: 2018-06-20

## 2022-02-01 RX ORDER — NETARSUDIL 0.2 MG/ML
1 DROP INTO AFFECTED EYE IN THE EVENING SOLUTION/ DROPS OPHTHALMIC; TOPICAL ONCE A DAY
Status: ACTIVE | COMMUNITY

## 2022-02-01 RX ORDER — BUDESONIDE, GLYCOPYRROLATE, AND FORMOTEROL FUMARATE 160; 9; 4.8 UG/1; UG/1; UG/1
2 PUFFS AEROSOL, METERED RESPIRATORY (INHALATION) TWICE A DAY
Status: ACTIVE | COMMUNITY

## 2022-02-01 RX ORDER — BUDESONIDE AND FORMOTEROL FUMARATE DIHYDRATE 160; 4.5 UG/1; UG/1
2 PUFFS AEROSOL RESPIRATORY (INHALATION) TWICE A DAY
Refills: 0 | Status: DISCONTINUED | COMMUNITY
Start: 2019-05-10

## 2022-02-01 RX ORDER — LIFITEGRAST 50 MG/ML
1 DROP INTO AFFECTED EYE SOLUTION/ DROPS OPHTHALMIC TWICE A DAY
Status: DISCONTINUED | COMMUNITY

## 2022-02-01 RX ORDER — OMEPRAZOLE 20 MG/1
1-2 CAPSULE 30 MINUTES BEFORE MORNING MEAL CAPSULE, DELAYED RELEASE ORAL ONCE A DAY
Qty: 180 | Refills: 2 | Status: DISCONTINUED | COMMUNITY
Start: 2020-10-12

## 2022-02-01 RX ORDER — COLCHICINE 0.6 MG/1
1 TABLET TABLET, FILM COATED ORAL ONCE A DAY
Refills: 0 | Status: ACTIVE | COMMUNITY

## 2022-02-01 RX ORDER — PREDNISONE 50 MG/1
2 TABLETS TABLET ORAL THREE TIMES A DAY
Status: DISCONTINUED | COMMUNITY

## 2022-02-01 RX ORDER — CARVEDILOL 3.12 MG/1
1 TABLET WITH FOOD TABLET, FILM COATED ORAL TWICE A DAY
Status: ACTIVE | COMMUNITY

## 2022-02-01 RX ORDER — BUMETANIDE 2 MG/1
AS DIRECTED TABLET ORAL
Status: ACTIVE | COMMUNITY

## 2022-02-01 RX ORDER — AZELASTINE HYDROCHLORIDE 137 UG/1
2 SPRAYS IN EACH NOSTRIL SPRAY, METERED NASAL TWICE A DAY
Status: DISCONTINUED | COMMUNITY

## 2022-02-01 RX ORDER — FEBUXOSTAT 40 MG/1
1 TABLET TABLET ORAL ONCE A DAY
Refills: 0 | Status: DISCONTINUED | COMMUNITY
Start: 2018-06-20

## 2022-02-01 RX ORDER — FAMOTIDINE 40 MG/1
1 TABLET AT BEDTIME TABLET, FILM COATED ORAL TWICE A DAY
Qty: 60 TABLET | Status: ACTIVE | COMMUNITY
Start: 2021-02-17

## 2022-02-01 RX ORDER — MULTIVITAMIN
1 TABLET TABLET ORAL ONCE A DAY
Status: DISCONTINUED | COMMUNITY

## 2022-02-01 RX ORDER — LATANOPROST/PF 0.005 %
1 DROP INTO AFFECTED EYE IN THE EVENING DROPS OPHTHALMIC (EYE) ONCE A DAY
Refills: 0 | Status: ACTIVE | COMMUNITY
Start: 2019-07-17

## 2022-02-01 RX ORDER — BUSPIRONE HYDROCHLORIDE 10 MG/1
1 TABLET TABLET ORAL TWICE A DAY
Refills: 0 | Status: DISCONTINUED | COMMUNITY
Start: 2019-05-22

## 2022-02-01 NOTE — HPI-OTHER HISTORIES
labs 2/10/2020 : Stool for C. difficile negative.   Colonoscopy November 2019 revealed a benign 3 mm descending colon polyp, normal terminal ileum, multiple diverticula in the sigmoid and ascending colon, otherwise normal colon and moderate internal/external hemorrhoids.

## 2022-02-01 NOTE — HPI-TODAY'S VISIT:
76-year-old female with a history of morbid obesity, irritable bowel syndrome, GERD, and colon diverticulosis presenting for follow-up. She was last seen 5/20/2021 for follow-up regarding constipation predominant irritable bowel syndrome.  Overall, her symptoms were fairly well managed with Metamucil on a daily probiotic.  She was encouraged a high-fiber, low-fat, lactose-free diet and the addition of MiraLAX as needed for constipation.  Regarding GERD and excessive gas, she is to continue daily omeprazole with Gaviscon as needed.  Within the last few weeks, she has noticed an increase in stool frequency. She feels the urge to have a bowel movement following any oral intake, though consistency varies. She remains on daily Metamucil but ran out of her probiotic. She has an average of 4 bowel movements per day, but consistency varies. She complains of occasional hemorrhoid prolapse and red blood on the toilet paper with wiping. She has tried OTC hemorrhoid cream and wipes with some relief. She has occasional lower abdominal cramping prior to a bowel movement which subsides with defecation. No nausea or vomiting.

## 2022-02-02 ENCOUNTER — TELEPHONE ENCOUNTER (OUTPATIENT)
Dept: URBAN - METROPOLITAN AREA CLINIC 113 | Facility: CLINIC | Age: 77
End: 2022-02-02

## 2022-02-02 RX ORDER — HYDROCORTISONE 25 MG/G
1 APPLICATION CREAM TOPICAL TWICE A DAY
Qty: 28 GRAM | Refills: 1 | OUTPATIENT

## 2022-02-02 RX ORDER — HYDROCORTISONE 25 MG/G
1 APPLICATION CREAM TOPICAL TWICE A DAY
Qty: 28 GRAMS | Refills: 1 | OUTPATIENT
Start: 2022-02-03 | End: 2022-04-04

## 2022-04-14 ENCOUNTER — OFFICE VISIT (OUTPATIENT)
Dept: URBAN - METROPOLITAN AREA CLINIC 113 | Facility: CLINIC | Age: 77
End: 2022-04-14

## 2022-04-26 ENCOUNTER — OFFICE VISIT (OUTPATIENT)
Dept: URBAN - METROPOLITAN AREA CLINIC 113 | Facility: CLINIC | Age: 77
End: 2022-04-26

## 2022-05-12 ENCOUNTER — LAB OUTSIDE AN ENCOUNTER (OUTPATIENT)
Dept: URBAN - METROPOLITAN AREA CLINIC 113 | Facility: CLINIC | Age: 77
End: 2022-05-12

## 2022-05-12 ENCOUNTER — OFFICE VISIT (OUTPATIENT)
Dept: URBAN - METROPOLITAN AREA CLINIC 113 | Facility: CLINIC | Age: 77
End: 2022-05-12
Payer: COMMERCIAL

## 2022-05-12 ENCOUNTER — TELEPHONE ENCOUNTER (OUTPATIENT)
Dept: URBAN - METROPOLITAN AREA CLINIC 113 | Facility: CLINIC | Age: 77
End: 2022-05-12

## 2022-05-12 VITALS
HEIGHT: 62 IN | RESPIRATION RATE: 20 BRPM | WEIGHT: 253 LBS | BODY MASS INDEX: 46.56 KG/M2 | DIASTOLIC BLOOD PRESSURE: 79 MMHG | SYSTOLIC BLOOD PRESSURE: 143 MMHG | TEMPERATURE: 96.9 F | HEART RATE: 80 BPM

## 2022-05-12 DIAGNOSIS — K58.1 IRRITABLE BOWEL SYNDROME WITH CONSTIPATION: ICD-10-CM

## 2022-05-12 DIAGNOSIS — K64.8 INTERNAL HEMORRHOID, BLEEDING: ICD-10-CM

## 2022-05-12 PROCEDURE — 99213 OFFICE O/P EST LOW 20 MIN: CPT | Performed by: INTERNAL MEDICINE

## 2022-05-12 RX ORDER — NETARSUDIL 0.2 MG/ML
1 DROP INTO AFFECTED EYE IN THE EVENING SOLUTION/ DROPS OPHTHALMIC; TOPICAL ONCE A DAY
Status: ACTIVE | COMMUNITY

## 2022-05-12 RX ORDER — BUMETANIDE 2 MG/1
AS DIRECTED TABLET ORAL
Status: ACTIVE | COMMUNITY

## 2022-05-12 RX ORDER — LATANOPROST/PF 0.005 %
1 DROP INTO AFFECTED EYE IN THE EVENING DROPS OPHTHALMIC (EYE) ONCE A DAY
Refills: 0 | Status: ACTIVE | COMMUNITY
Start: 2019-07-17

## 2022-05-12 RX ORDER — ALPRAZOLAM 1 MG/1
1 TABLET TABLET ORAL ONCE DAILY
Status: ACTIVE | COMMUNITY

## 2022-05-12 RX ORDER — FAMOTIDINE 40 MG/1
1 TABLET AT BEDTIME TABLET, FILM COATED ORAL TWICE A DAY
Qty: 60 TABLET | Status: ACTIVE | COMMUNITY
Start: 2021-02-17

## 2022-05-12 RX ORDER — BUDESONIDE, GLYCOPYRROLATE, AND FORMOTEROL FUMARATE 160; 9; 4.8 UG/1; UG/1; UG/1
2 PUFFS AEROSOL, METERED RESPIRATORY (INHALATION) TWICE A DAY
Status: ACTIVE | COMMUNITY

## 2022-05-12 RX ORDER — COLCHICINE 0.6 MG/1
1 TABLET TABLET, FILM COATED ORAL ONCE A DAY
Refills: 0 | Status: ACTIVE | COMMUNITY

## 2022-05-12 RX ORDER — CARVEDILOL 3.12 MG/1
1 TABLET WITH FOOD TABLET, FILM COATED ORAL TWICE A DAY
Status: ACTIVE | COMMUNITY

## 2022-05-12 RX ORDER — HYDROCORTISONE 25 MG/G
1 APPLICATION CREAM TOPICAL TWICE A DAY
Qty: 28 GRAM | Refills: 1 | Status: ACTIVE | COMMUNITY

## 2022-05-12 RX ORDER — ALBUTEROL SULFATE 90 UG/1
INHALE 1 PUFF EVERY 4 HOURS AS NEEDED AEROSOL, METERED RESPIRATORY (INHALATION)
Refills: 0 | Status: ACTIVE | COMMUNITY
Start: 2018-06-20

## 2022-05-12 RX ORDER — HYDROCODONE BITARTRATE AND ACETAMINOPHEN 5; 325 MG/1; MG/1
1 TABLET AS NEEDED TABLET ORAL
Status: ACTIVE | COMMUNITY

## 2022-05-12 RX ORDER — SPIRONOLACTONE 50 MG/1
1 TABLET TABLET, FILM COATED ORAL ONCE A DAY
Status: ACTIVE | COMMUNITY

## 2022-05-12 RX ORDER — SITAGLIPTIN AND METFORMIN HYDROCHLORIDE 50; 500 MG/1; MG/1
1 TABLET WITH MEALS TABLET, FILM COATED ORAL ONCE DAILY
Status: ACTIVE | COMMUNITY

## 2022-05-12 NOTE — HPI-TODAY'S VISIT:
76-year-old female with a history of morbid obesity, irritable bowel syndrome, GERD, and colon diverticulosis presenting for follow-up. She was last seen 2/1/2022 for evaluation of a recent increase in stool frequency, with varying consistency and associated postprandial abdominal cramping suspected to be related to irritable bowel syndrome.  She was instructed to increase her daily fiber and resume a probiotic given prior response.  Anusol suppositories were provided to use as needed for symptomatic hemorrhoids. Her bowel habits have normalized. She is having two normal stools per day with fiber supplementation and pearls probiotic. She has occasional lower abdominal cramping prior to a bowel movement which subsides with defecation. No nausea or vomiting. She continues to experience small-volume red blood per rectum and hemorrhoid prolapse with most bowel movements, require manual reduction. Topical creams and suppositories have been ineffective.

## 2022-05-13 NOTE — PHYSICAL EXAM LUNGS:
[FreeTextEntry1] : 72 Y OLD MALE WITH PMX OF HTN = STABLE ON AMLODIPINE 5 MG DAILY \par DYSLIPIDEMIA = ON OTC RY RICE\par R HAND OA= BRACE RECOMM ,HE HAS IT AT HOME \par WT LOSS AND EXERCISE DISCUSSED\par RTO 3 M  no increased work of breathing or signs of respiratory distress

## 2022-07-08 ENCOUNTER — OFFICE VISIT (OUTPATIENT)
Dept: URBAN - METROPOLITAN AREA CLINIC 113 | Facility: CLINIC | Age: 77
End: 2022-07-08
Payer: COMMERCIAL

## 2022-07-08 VITALS
TEMPERATURE: 97.8 F | HEART RATE: 84 BPM | HEIGHT: 62 IN | WEIGHT: 254 LBS | DIASTOLIC BLOOD PRESSURE: 77 MMHG | BODY MASS INDEX: 46.74 KG/M2 | SYSTOLIC BLOOD PRESSURE: 124 MMHG

## 2022-07-08 DIAGNOSIS — K62.89 PROCTALGIA: ICD-10-CM

## 2022-07-08 DIAGNOSIS — K64.8 INTERNAL HEMORRHOID, BLEEDING: ICD-10-CM

## 2022-07-08 DIAGNOSIS — K57.90 DIVERTICULOSIS: ICD-10-CM

## 2022-07-08 DIAGNOSIS — K58.1 IRRITABLE BOWEL SYNDROME WITH CONSTIPATION: ICD-10-CM

## 2022-07-08 PROBLEM — 75884004: Status: ACTIVE | Noted: 2021-05-24

## 2022-07-08 PROBLEM — 397881000: Status: ACTIVE | Noted: 2022-07-08

## 2022-07-08 PROBLEM — 77880009: Status: ACTIVE | Noted: 2022-07-08

## 2022-07-08 PROBLEM — 440630006 IRRITABLE BOWEL SYNDROME CHARACTERIZED BY CONSTIPATION: Status: ACTIVE | Noted: 2021-05-24

## 2022-07-08 PROCEDURE — 99213 OFFICE O/P EST LOW 20 MIN: CPT | Performed by: INTERNAL MEDICINE

## 2022-07-08 PROCEDURE — 46611 ANOSCOPY: CPT | Performed by: INTERNAL MEDICINE

## 2022-07-08 RX ORDER — ALPRAZOLAM 1 MG/1
1 TABLET TABLET ORAL ONCE DAILY
Status: ACTIVE | COMMUNITY

## 2022-07-08 RX ORDER — SPIRONOLACTONE 50 MG/1
1 TABLET TABLET, FILM COATED ORAL ONCE A DAY
Status: ACTIVE | COMMUNITY

## 2022-07-08 RX ORDER — BUDESONIDE, GLYCOPYRROLATE, AND FORMOTEROL FUMARATE 160; 9; 4.8 UG/1; UG/1; UG/1
2 PUFFS AEROSOL, METERED RESPIRATORY (INHALATION) TWICE A DAY
Status: ACTIVE | COMMUNITY

## 2022-07-08 RX ORDER — HYDROCODONE BITARTRATE AND ACETAMINOPHEN 5; 325 MG/1; MG/1
1 TABLET AS NEEDED TABLET ORAL
Status: ACTIVE | COMMUNITY

## 2022-07-08 RX ORDER — FAMOTIDINE 40 MG/1
1 TABLET AT BEDTIME TABLET, FILM COATED ORAL TWICE A DAY
Qty: 60 TABLET | Status: ACTIVE | COMMUNITY
Start: 2021-02-17

## 2022-07-08 RX ORDER — LATANOPROST/PF 0.005 %
1 DROP INTO AFFECTED EYE IN THE EVENING DROPS OPHTHALMIC (EYE) ONCE A DAY
Refills: 0 | Status: ACTIVE | COMMUNITY
Start: 2019-07-17

## 2022-07-08 RX ORDER — BUMETANIDE 2 MG/1
AS DIRECTED TABLET ORAL
Status: ACTIVE | COMMUNITY

## 2022-07-08 RX ORDER — CARVEDILOL 3.12 MG/1
1 TABLET WITH FOOD TABLET, FILM COATED ORAL TWICE A DAY
Status: ACTIVE | COMMUNITY

## 2022-07-08 RX ORDER — SITAGLIPTIN AND METFORMIN HYDROCHLORIDE 50; 500 MG/1; MG/1
1 TABLET WITH MEALS TABLET, FILM COATED ORAL ONCE DAILY
Status: ACTIVE | COMMUNITY

## 2022-07-08 RX ORDER — ALBUTEROL SULFATE 90 UG/1
INHALE 1 PUFF EVERY 4 HOURS AS NEEDED AEROSOL, METERED RESPIRATORY (INHALATION)
Refills: 0 | Status: ACTIVE | COMMUNITY
Start: 2018-06-20

## 2022-07-08 RX ORDER — HYDROCORTISONE 25 MG/G
1 APPLICATION CREAM TOPICAL TWICE A DAY
Qty: 28 GRAM | Refills: 1 | Status: ACTIVE | COMMUNITY

## 2022-07-08 RX ORDER — COLCHICINE 0.6 MG/1
1 TABLET TABLET, FILM COATED ORAL ONCE A DAY
Refills: 0 | Status: ACTIVE | COMMUNITY

## 2022-07-08 RX ORDER — NETARSUDIL 0.2 MG/ML
1 DROP INTO AFFECTED EYE IN THE EVENING SOLUTION/ DROPS OPHTHALMIC; TOPICAL ONCE A DAY
Status: ACTIVE | COMMUNITY

## 2022-07-08 NOTE — HPI-TODAY'S VISIT:
Ms. Bear is a 77 year-old female with a history of morbid obesity, irritable bowel syndrome, GERD, and colon diverticulosis presenting for hemorrhoid banding.  She has been having rectal bleeding daily and is having 2-3 BMs daily.  She started Metamucil after her last office visit which has helped her constipation.  She denies perianal pain or itching.

## 2022-07-08 NOTE — HPI-OTHER HISTORIES
Colonoscopy November 2019 with Dr Quiroz revealed a benign 3 mm descending colon polyp, normal terminal ileum, multiple diverticula in the sigmoid and ascending colon, otherwise normal colon and moderate internal/external hemorrhoids.  No further colonoscopies were recommended given her advanced age.

## 2022-07-08 NOTE — PHYSICAL EXAM GASTROINTESTINAL
Abdomen , soft, nontender, nondistended , no guarding or rigidity , no masses palpable , normal bowel sounds , Liver and Spleen , no hepatomegaly present , no hepatosplenomegaly , liver nontender , spleen not palpable , Rectal , limited exam due to discomfort and body habitus, increased sphincter tone , small internal hemorrhoids, no rectal masses or bleeding present;Limited anoscopy revealed small internal hemorrhoids mainly in the RA and RP position.

## 2023-03-15 ENCOUNTER — TELEPHONE ENCOUNTER (OUTPATIENT)
Dept: URBAN - METROPOLITAN AREA CLINIC 113 | Facility: CLINIC | Age: 78
End: 2023-03-15

## 2023-03-15 RX ORDER — HYDROCORTISONE 25 MG/G
1 APPLICATION CREAM TOPICAL TWICE A DAY
Qty: 10 | Refills: 1 | OUTPATIENT
Start: 2023-03-20 | End: 2023-04-17

## 2024-04-08 ENCOUNTER — OV EP (OUTPATIENT)
Dept: URBAN - METROPOLITAN AREA CLINIC 113 | Facility: CLINIC | Age: 79
End: 2024-04-08

## 2024-04-29 ENCOUNTER — OV EP (OUTPATIENT)
Dept: URBAN - METROPOLITAN AREA CLINIC 113 | Facility: CLINIC | Age: 79
End: 2024-04-29

## 2024-05-15 ENCOUNTER — LAB OUTSIDE AN ENCOUNTER (OUTPATIENT)
Dept: URBAN - METROPOLITAN AREA CLINIC 113 | Facility: CLINIC | Age: 79
End: 2024-05-15

## 2024-05-15 ENCOUNTER — TELEPHONE ENCOUNTER (OUTPATIENT)
Dept: URBAN - METROPOLITAN AREA CLINIC 113 | Facility: CLINIC | Age: 79
End: 2024-05-15

## 2024-05-15 ENCOUNTER — DASHBOARD ENCOUNTERS (OUTPATIENT)
Age: 79
End: 2024-05-15

## 2024-05-15 ENCOUNTER — OFFICE VISIT (OUTPATIENT)
Dept: URBAN - METROPOLITAN AREA CLINIC 113 | Facility: CLINIC | Age: 79
End: 2024-05-15
Payer: MEDICARE

## 2024-05-15 VITALS
HEART RATE: 71 BPM | DIASTOLIC BLOOD PRESSURE: 76 MMHG | BODY MASS INDEX: 44.72 KG/M2 | RESPIRATION RATE: 20 BRPM | WEIGHT: 243 LBS | SYSTOLIC BLOOD PRESSURE: 142 MMHG | TEMPERATURE: 97.1 F | HEIGHT: 62 IN

## 2024-05-15 DIAGNOSIS — K57.30 COLON, DIVERTICULOSIS: ICD-10-CM

## 2024-05-15 DIAGNOSIS — K64.8 INTERNAL HEMORRHOID, BLEEDING: ICD-10-CM

## 2024-05-15 DIAGNOSIS — K21.9 GASTROESOPHAGEAL REFLUX DISEASE, UNSPECIFIED WHETHER ESOPHAGITIS PRESENT: ICD-10-CM

## 2024-05-15 DIAGNOSIS — K58.1 IRRITABLE BOWEL SYNDROME WITH CONSTIPATION: ICD-10-CM

## 2024-05-15 PROBLEM — 235595009: Status: ACTIVE | Noted: 2024-05-15

## 2024-05-15 PROCEDURE — 99214 OFFICE O/P EST MOD 30 MIN: CPT

## 2024-05-15 PROCEDURE — 99244 OFF/OP CNSLTJ NEW/EST MOD 40: CPT

## 2024-05-15 RX ORDER — HYDROCORTISONE 25 MG/G
1 APPLICATION CREAM TOPICAL TWICE A DAY
Qty: 28 GRAM | Refills: 1 | Status: ON HOLD | COMMUNITY

## 2024-05-15 RX ORDER — FAMOTIDINE 40 MG/1
1 TABLET AT BEDTIME TABLET, FILM COATED ORAL TWICE A DAY
Qty: 60 TABLET | Status: ON HOLD | COMMUNITY
Start: 2021-02-17

## 2024-05-15 RX ORDER — PANTOPRAZOLE SODIUM 40 MG/1
1 TABLET TABLET, DELAYED RELEASE ORAL ONCE A DAY
Qty: 30 | OUTPATIENT
Start: 2024-05-15

## 2024-05-15 RX ORDER — BUDESONIDE, GLYCOPYRROLATE, AND FORMOTEROL FUMARATE 160; 9; 4.8 UG/1; UG/1; UG/1
2 PUFFS AEROSOL, METERED RESPIRATORY (INHALATION) TWICE A DAY
Status: ACTIVE | COMMUNITY

## 2024-05-15 RX ORDER — ALBUTEROL SULFATE 90 UG/1
INHALE 1 PUFF EVERY 4 HOURS AS NEEDED AEROSOL, METERED RESPIRATORY (INHALATION)
Refills: 0 | Status: ACTIVE | COMMUNITY
Start: 2018-06-20

## 2024-05-15 RX ORDER — SITAGLIPTIN AND METFORMIN HYDROCHLORIDE 50; 500 MG/1; MG/1
1 TABLET WITH MEALS TABLET, FILM COATED ORAL ONCE DAILY
Status: ACTIVE | COMMUNITY

## 2024-05-15 RX ORDER — HYDROCODONE BITARTRATE AND ACETAMINOPHEN 5; 325 MG/1; MG/1
1 TABLET AS NEEDED TABLET ORAL
Status: ON HOLD | COMMUNITY

## 2024-05-15 RX ORDER — SPIRONOLACTONE 50 MG/1
1 TABLET TABLET, FILM COATED ORAL ONCE A DAY
Status: ACTIVE | COMMUNITY

## 2024-05-15 RX ORDER — ALPRAZOLAM 1 MG/1
1 TABLET TABLET ORAL ONCE DAILY
Status: ACTIVE | COMMUNITY

## 2024-05-15 RX ORDER — NETARSUDIL 0.2 MG/ML
1 DROP INTO AFFECTED EYE IN THE EVENING SOLUTION/ DROPS OPHTHALMIC; TOPICAL ONCE A DAY
Status: ACTIVE | COMMUNITY

## 2024-05-15 RX ORDER — CARVEDILOL 3.12 MG/1
1 TABLET WITH FOOD TABLET, FILM COATED ORAL TWICE A DAY
Status: ACTIVE | COMMUNITY

## 2024-05-15 RX ORDER — HYDROCORTISONE ACETATE 25 MG/1
1 SUPPOSITORY SUPPOSITORY RECTAL ONCE A DAY
Qty: 7 | Refills: 3 | OUTPATIENT
Start: 2024-05-15 | End: 2024-06-12

## 2024-05-15 RX ORDER — LATANOPROST/PF 0.005 %
1 DROP INTO AFFECTED EYE IN THE EVENING DROPS OPHTHALMIC (EYE) ONCE A DAY
Refills: 0 | Status: ACTIVE | COMMUNITY
Start: 2019-07-17

## 2024-05-15 RX ORDER — BUMETANIDE 2 MG/1
AS DIRECTED TABLET ORAL
Status: ACTIVE | COMMUNITY

## 2024-05-15 RX ORDER — COLCHICINE 0.6 MG/1
1 TABLET TABLET, FILM COATED ORAL ONCE A DAY
Refills: 0 | Status: ACTIVE | COMMUNITY

## 2024-06-26 ENCOUNTER — OFFICE VISIT (OUTPATIENT)
Dept: URBAN - METROPOLITAN AREA CLINIC 113 | Facility: CLINIC | Age: 79
End: 2024-06-26

## 2024-07-02 ENCOUNTER — ERX REFILL RESPONSE (OUTPATIENT)
Dept: URBAN - METROPOLITAN AREA CLINIC 113 | Facility: CLINIC | Age: 79
End: 2024-07-02

## 2024-07-02 RX ORDER — PANTOPRAZOLE SODIUM 40 MG/1
TAKE 1 TABLET BY MOUTH DAILY TABLET, DELAYED RELEASE ORAL
Qty: 30 TABLET | Refills: 0 | OUTPATIENT

## 2024-07-02 RX ORDER — PANTOPRAZOLE SODIUM 40 MG/1
1 TABLET TABLET, DELAYED RELEASE ORAL ONCE A DAY
Qty: 30 | OUTPATIENT

## 2024-07-20 ENCOUNTER — ERX REFILL RESPONSE (OUTPATIENT)
Dept: URBAN - METROPOLITAN AREA CLINIC 113 | Facility: CLINIC | Age: 79
End: 2024-07-20

## 2024-07-20 RX ORDER — PANTOPRAZOLE SODIUM 40 MG/1
TAKE 1 TABLET BY MOUTH DAILY TABLET, DELAYED RELEASE ORAL
Qty: 90 TABLET | Refills: 0 | OUTPATIENT

## 2024-08-14 ENCOUNTER — OFFICE VISIT (OUTPATIENT)
Dept: URBAN - METROPOLITAN AREA CLINIC 113 | Facility: CLINIC | Age: 79
End: 2024-08-14

## 2024-08-14 ENCOUNTER — OFFICE VISIT (OUTPATIENT)
Dept: URBAN - METROPOLITAN AREA CLINIC 113 | Facility: CLINIC | Age: 79
End: 2024-08-14
Payer: MEDICARE

## 2024-08-14 VITALS
BODY MASS INDEX: 40.48 KG/M2 | HEART RATE: 87 BPM | SYSTOLIC BLOOD PRESSURE: 130 MMHG | DIASTOLIC BLOOD PRESSURE: 79 MMHG | WEIGHT: 220 LBS | RESPIRATION RATE: 16 BRPM | TEMPERATURE: 97.9 F | HEIGHT: 62 IN

## 2024-08-14 DIAGNOSIS — K64.8 INTERNAL HEMORRHOID: ICD-10-CM

## 2024-08-14 DIAGNOSIS — K21.9 GASTROESOPHAGEAL REFLUX DISEASE WITHOUT ESOPHAGITIS: ICD-10-CM

## 2024-08-14 PROBLEM — 266435005: Status: ACTIVE | Noted: 2024-08-14

## 2024-08-14 PROCEDURE — 99213 OFFICE O/P EST LOW 20 MIN: CPT | Performed by: INTERNAL MEDICINE

## 2024-08-14 RX ORDER — NETARSUDIL 0.2 MG/ML
1 DROP INTO AFFECTED EYE IN THE EVENING SOLUTION/ DROPS OPHTHALMIC; TOPICAL ONCE A DAY
Status: ACTIVE | COMMUNITY

## 2024-08-14 RX ORDER — SPIRONOLACTONE 50 MG/1
1 TABLET TABLET, FILM COATED ORAL ONCE A DAY
Status: ACTIVE | COMMUNITY

## 2024-08-14 RX ORDER — BUDESONIDE, GLYCOPYRROLATE, AND FORMOTEROL FUMARATE 160; 9; 4.8 UG/1; UG/1; UG/1
2 PUFFS AEROSOL, METERED RESPIRATORY (INHALATION) TWICE A DAY
Status: ACTIVE | COMMUNITY

## 2024-08-14 RX ORDER — CARVEDILOL 3.12 MG/1
1 TABLET WITH FOOD TABLET, FILM COATED ORAL TWICE A DAY
Status: ACTIVE | COMMUNITY

## 2024-08-14 RX ORDER — HYDROCORTISONE 25 MG/G
1 APPLICATION CREAM TOPICAL TWICE A DAY
Qty: 28 GRAM | Refills: 1 | Status: ON HOLD | COMMUNITY

## 2024-08-14 RX ORDER — FAMOTIDINE 40 MG/1
1 TABLET AT BEDTIME TABLET, FILM COATED ORAL TWICE A DAY
Qty: 60 TABLET | Status: ON HOLD | COMMUNITY
Start: 2021-02-17

## 2024-08-14 RX ORDER — ALBUTEROL SULFATE 90 UG/1
INHALE 1 PUFF EVERY 4 HOURS AS NEEDED AEROSOL, METERED RESPIRATORY (INHALATION)
Refills: 0 | Status: ACTIVE | COMMUNITY
Start: 2018-06-20

## 2024-08-14 RX ORDER — RABEPRAZOLE SODIUM 20 MG/1
1 TABLET TABLET, DELAYED RELEASE ORAL TWICE A DAY
Qty: 60 TABLET | Refills: 3 | OUTPATIENT
Start: 2024-08-14

## 2024-08-14 RX ORDER — LATANOPROST/PF 0.005 %
1 DROP INTO AFFECTED EYE IN THE EVENING DROPS OPHTHALMIC (EYE) ONCE A DAY
Refills: 0 | Status: ACTIVE | COMMUNITY
Start: 2019-07-17

## 2024-08-14 RX ORDER — HYDROCODONE BITARTRATE AND ACETAMINOPHEN 5; 325 MG/1; MG/1
1 TABLET AS NEEDED TABLET ORAL
Status: ON HOLD | COMMUNITY

## 2024-08-14 RX ORDER — COLCHICINE 0.6 MG/1
1 TABLET TABLET, FILM COATED ORAL ONCE A DAY
Refills: 0 | Status: ACTIVE | COMMUNITY

## 2024-08-14 RX ORDER — ALPRAZOLAM 1 MG/1
1 TABLET TABLET ORAL ONCE DAILY
Status: ACTIVE | COMMUNITY

## 2024-08-14 RX ORDER — BUMETANIDE 2 MG/1
AS DIRECTED TABLET ORAL
Status: ACTIVE | COMMUNITY

## 2024-08-14 RX ORDER — PANTOPRAZOLE SODIUM 40 MG/1
TAKE 1 TABLET BY MOUTH DAILY TABLET, DELAYED RELEASE ORAL
Qty: 90 TABLET | Refills: 0 | Status: ACTIVE | COMMUNITY

## 2024-08-14 RX ORDER — SITAGLIPTIN AND METFORMIN HYDROCHLORIDE 50; 500 MG/1; MG/1
1 TABLET WITH MEALS TABLET, FILM COATED ORAL ONCE DAILY
Status: ACTIVE | COMMUNITY

## 2024-08-14 NOTE — HPI-TODAY'S VISIT:
Ms. Bear is a 78-year-old woman with a history of morbid obesity, irritable bowel syndrome, GERD, colon diverticulosis presenting for internal hemorrhoid banding of symptomatic internal hemorrhoids.  She was last seen in the office on 5/15/2024 for follow-up regarding constipation predominant irritable bowel syndrome and bleeding from internal hemorrhoids.  She was given hydrocortisone suppository and scheduled for internal hemorrhoid banding.  She reports hemorrhoids with intermittent red blood per rectum that generally happens weekly.  She has some perianal pain and prolapsing tissue.  Her bowel movements are fairly regular and consistency is normal taking Metamucil daily, a probiotic and MiraLAX daily.  However, she feels that she is unable to undergo the hemorrhoid banding today because of an exacerbation of her rheumatoid arthritis is resulted in severe left shoulder and joint pains.  She has longstanding issues of heartburn and regurgitation but no dysphagia.  She has tried omeprazole that resulted in a sore throat and pantoprazole that was ineffective at relieving symptoms.

## 2024-08-21 ENCOUNTER — OFFICE VISIT (OUTPATIENT)
Dept: URBAN - METROPOLITAN AREA CLINIC 113 | Facility: CLINIC | Age: 79
End: 2024-08-21

## 2024-08-21 ENCOUNTER — TELEPHONE ENCOUNTER (OUTPATIENT)
Dept: URBAN - METROPOLITAN AREA CLINIC 113 | Facility: CLINIC | Age: 79
End: 2024-08-21

## 2024-08-21 RX ORDER — COLCHICINE 0.6 MG/1
1 TABLET TABLET, FILM COATED ORAL ONCE A DAY
Refills: 0 | Status: ACTIVE | COMMUNITY

## 2024-08-21 RX ORDER — RABEPRAZOLE SODIUM 20 MG/1
1 TABLET TABLET, DELAYED RELEASE ORAL TWICE A DAY
Qty: 60 TABLET | Refills: 3 | Status: ACTIVE | COMMUNITY
Start: 2024-08-14

## 2024-08-21 RX ORDER — BUMETANIDE 2 MG/1
AS DIRECTED TABLET ORAL
Status: ACTIVE | COMMUNITY

## 2024-08-21 RX ORDER — LATANOPROST/PF 0.005 %
1 DROP INTO AFFECTED EYE IN THE EVENING DROPS OPHTHALMIC (EYE) ONCE A DAY
Refills: 0 | Status: ACTIVE | COMMUNITY
Start: 2019-07-17

## 2024-08-21 RX ORDER — FAMOTIDINE 40 MG/1
1 TABLET AT BEDTIME TABLET, FILM COATED ORAL TWICE A DAY
Qty: 60 TABLET | Status: ON HOLD | COMMUNITY
Start: 2021-02-17

## 2024-08-21 RX ORDER — SPIRONOLACTONE 50 MG/1
1 TABLET TABLET, FILM COATED ORAL ONCE A DAY
Status: ACTIVE | COMMUNITY

## 2024-08-21 RX ORDER — NETARSUDIL 0.2 MG/ML
1 DROP INTO AFFECTED EYE IN THE EVENING SOLUTION/ DROPS OPHTHALMIC; TOPICAL ONCE A DAY
Status: ACTIVE | COMMUNITY

## 2024-08-21 RX ORDER — HYDROCODONE BITARTRATE AND ACETAMINOPHEN 5; 325 MG/1; MG/1
1 TABLET AS NEEDED TABLET ORAL
Status: ON HOLD | COMMUNITY

## 2024-08-21 RX ORDER — BUDESONIDE, GLYCOPYRROLATE, AND FORMOTEROL FUMARATE 160; 9; 4.8 UG/1; UG/1; UG/1
2 PUFFS AEROSOL, METERED RESPIRATORY (INHALATION) TWICE A DAY
Status: ACTIVE | COMMUNITY

## 2024-08-21 RX ORDER — HYDROCORTISONE 25 MG/G
1 APPLICATION CREAM TOPICAL TWICE A DAY
Qty: 28 GRAM | Refills: 1 | Status: ON HOLD | COMMUNITY

## 2024-08-21 RX ORDER — PANTOPRAZOLE SODIUM 40 MG/1
TAKE 1 TABLET BY MOUTH DAILY TABLET, DELAYED RELEASE ORAL
Qty: 90 TABLET | Refills: 0 | Status: ACTIVE | COMMUNITY

## 2024-08-21 RX ORDER — SITAGLIPTIN AND METFORMIN HYDROCHLORIDE 50; 500 MG/1; MG/1
1 TABLET WITH MEALS TABLET, FILM COATED ORAL ONCE DAILY
Status: ACTIVE | COMMUNITY

## 2024-08-21 RX ORDER — CARVEDILOL 3.12 MG/1
1 TABLET WITH FOOD TABLET, FILM COATED ORAL TWICE A DAY
Status: ACTIVE | COMMUNITY

## 2024-08-21 RX ORDER — ALPRAZOLAM 1 MG/1
1 TABLET TABLET ORAL ONCE DAILY
Status: ACTIVE | COMMUNITY

## 2024-08-21 RX ORDER — ALBUTEROL SULFATE 90 UG/1
INHALE 1 PUFF EVERY 4 HOURS AS NEEDED AEROSOL, METERED RESPIRATORY (INHALATION)
Refills: 0 | Status: ACTIVE | COMMUNITY
Start: 2018-06-20

## 2024-09-09 ENCOUNTER — OFFICE VISIT (OUTPATIENT)
Dept: URBAN - METROPOLITAN AREA CLINIC 113 | Facility: CLINIC | Age: 79
End: 2024-09-09
Payer: MEDICARE

## 2024-09-09 VITALS — HEIGHT: 62 IN

## 2024-09-09 DIAGNOSIS — K64.1 GRADE II HEMORRHOIDS: ICD-10-CM

## 2024-09-09 PROCEDURE — 46221 LIGATION OF HEMORRHOID(S): CPT | Performed by: INTERNAL MEDICINE

## 2024-09-09 RX ORDER — FAMOTIDINE 40 MG/1
1 TABLET AT BEDTIME TABLET, FILM COATED ORAL TWICE A DAY
Qty: 60 TABLET | Status: DISCONTINUED | COMMUNITY
Start: 2021-02-17

## 2024-09-09 RX ORDER — COLCHICINE 0.6 MG/1
1 TABLET TABLET, FILM COATED ORAL ONCE A DAY
Refills: 0 | Status: ACTIVE | COMMUNITY

## 2024-09-09 RX ORDER — SITAGLIPTIN AND METFORMIN HYDROCHLORIDE 100; 1000 MG/1; MG/1
1 TABLET WITH EVENING MEAL TABLET, FILM COATED, EXTENDED RELEASE ORAL ONCE A DAY
Status: ACTIVE | COMMUNITY

## 2024-09-09 RX ORDER — POTASSIUM CHLORIDE 1500 MG/1
1 TABLET WITH FOOD TABLET, EXTENDED RELEASE ORAL ONCE A DAY
Status: ACTIVE | COMMUNITY

## 2024-09-09 RX ORDER — FLUTICASONE FUROATE, UMECLIDINIUM BROMIDE AND VILANTEROL TRIFENATATE 100; 62.5; 25 UG/1; UG/1; UG/1
1 PUFF POWDER RESPIRATORY (INHALATION) ONCE A DAY
Status: ACTIVE | COMMUNITY

## 2024-09-09 RX ORDER — NETARSUDIL 0.2 MG/ML
1 DROP INTO AFFECTED EYE IN THE EVENING SOLUTION/ DROPS OPHTHALMIC; TOPICAL ONCE A DAY
Status: DISCONTINUED | COMMUNITY

## 2024-09-09 RX ORDER — EVOLOCUMAB 140 MG/ML
AS DIRECTED INJECTION, SOLUTION SUBCUTANEOUS
Status: ACTIVE | COMMUNITY

## 2024-09-09 RX ORDER — RABEPRAZOLE SODIUM 20 MG/1
1 TABLET TABLET, DELAYED RELEASE ORAL TWICE A DAY
Qty: 60 TABLET | Refills: 3 | Status: DISCONTINUED | COMMUNITY
Start: 2024-08-14

## 2024-09-09 RX ORDER — CARVEDILOL 3.12 MG/1
1 TABLET WITH FOOD TABLET, FILM COATED ORAL TWICE A DAY
Status: ACTIVE | COMMUNITY

## 2024-09-09 RX ORDER — BUSPIRONE HYDROCHLORIDE 10 MG/1
1 TABLET TABLET ORAL TWICE A DAY
Status: ACTIVE | COMMUNITY

## 2024-09-09 RX ORDER — ALBUTEROL SULFATE 90 UG/1
INHALE 1 PUFF EVERY 4 HOURS AS NEEDED AEROSOL, METERED RESPIRATORY (INHALATION)
Refills: 0 | Status: ACTIVE | COMMUNITY
Start: 2018-06-20

## 2024-09-09 RX ORDER — ALPRAZOLAM 1 MG/1
1 TABLET TABLET ORAL ONCE DAILY
Status: ACTIVE | COMMUNITY

## 2024-09-09 RX ORDER — LATANOPROST/PF 0.005 %
1 DROP INTO AFFECTED EYE IN THE EVENING DROPS OPHTHALMIC (EYE) ONCE A DAY
Refills: 0 | Status: ACTIVE | COMMUNITY
Start: 2019-07-17

## 2024-09-09 RX ORDER — SITAGLIPTIN AND METFORMIN HYDROCHLORIDE 50; 500 MG/1; MG/1
1 TABLET WITH MEALS TABLET, FILM COATED ORAL ONCE DAILY
Status: DISCONTINUED | COMMUNITY

## 2024-09-09 RX ORDER — BUMETANIDE 2 MG/1
1 TABLET TABLET ORAL ONCE A DAY
Status: ACTIVE | COMMUNITY

## 2024-09-09 RX ORDER — PANTOPRAZOLE SODIUM 40 MG/1
TAKE 1 TABLET BY MOUTH DAILY TABLET, DELAYED RELEASE ORAL
Qty: 90 TABLET | Refills: 0 | Status: DISCONTINUED | COMMUNITY

## 2024-09-09 RX ORDER — BUDESONIDE, GLYCOPYRROLATE, AND FORMOTEROL FUMARATE 160; 9; 4.8 UG/1; UG/1; UG/1
2 PUFFS AEROSOL, METERED RESPIRATORY (INHALATION) TWICE A DAY
Status: DISCONTINUED | COMMUNITY

## 2024-09-09 RX ORDER — ETANERCEPT 50 MG/ML
1 ML SOLUTION SUBCUTANEOUS WEEKLY
Status: ACTIVE | COMMUNITY

## 2024-09-09 RX ORDER — SPIRONOLACTONE 50 MG/1
1 TABLET TABLET, FILM COATED ORAL ONCE A DAY
Status: DISCONTINUED | COMMUNITY

## 2024-09-09 RX ORDER — GARLIC 1000 MG
1 CAPSULE CAPSULE ORAL DAILY
Status: ACTIVE | COMMUNITY

## 2024-09-09 RX ORDER — POLYETHYLENE GLYCOL 400 AND PROPYLENE GLYCOL 4; 3 MG/ML; MG/ML
APPLY TO AFFECTED EYE GEL OPHTHALMIC
Status: ACTIVE | COMMUNITY

## 2024-09-09 RX ORDER — HYDROCORTISONE 25 MG/G
1 APPLICATION CREAM TOPICAL TWICE A DAY
Qty: 28 GRAM | Refills: 1 | Status: DISCONTINUED | COMMUNITY

## 2024-09-09 RX ORDER — BRIMONIDINE TARTRATE AND TIMOLOL MALEATE 2; 5 MG/ML; MG/ML
1 DROP INTO AFFECTED EYE SOLUTION/ DROPS OPHTHALMIC TWICE A DAY
Status: ACTIVE | COMMUNITY

## 2024-09-09 RX ORDER — HYDROCODONE BITARTRATE AND ACETAMINOPHEN 5; 325 MG/1; MG/1
1 TABLET AS NEEDED TABLET ORAL
Status: DISCONTINUED | COMMUNITY

## 2024-09-09 NOTE — HPI-TODAY'S VISIT:
Ms. Bear is a 79-year-old woman with a history of obesity, insulin-dependent diabetes and rheumatoid arthritis on Enbrel presenting for internal hemrrhoid banding of symptomatic internal hemorrhoids.  She was last seen on 8/14/2024 for follow up regarding symptomatic internal hemorrhoid.  At that visit she was unable to get onto the exam table to an exacerbation of her arthritis.  The internal hemorrhoid banding procedure was rescheduled.  Her GERD symptoms were treated with rabeprazole 20 mg daily.  She returns for follow-up reporting that her neuritis symptoms that made in her last visit have.  She is having fairly regular bowel movements that are normal consistency, but she is having varying amounts of blood per rectum with every bowel movement.  No straining to have a bowel movement.  She denies any perianal itching or pain.

## 2024-10-02 ENCOUNTER — ERX REFILL RESPONSE (OUTPATIENT)
Dept: URBAN - METROPOLITAN AREA CLINIC 113 | Facility: CLINIC | Age: 79
End: 2024-10-02

## 2024-10-02 ENCOUNTER — OFFICE VISIT (OUTPATIENT)
Dept: URBAN - METROPOLITAN AREA CLINIC 113 | Facility: CLINIC | Age: 79
End: 2024-10-02
Payer: MEDICARE

## 2024-10-02 VITALS
DIASTOLIC BLOOD PRESSURE: 81 MMHG | HEIGHT: 62 IN | BODY MASS INDEX: 41.04 KG/M2 | TEMPERATURE: 98.1 F | HEART RATE: 85 BPM | SYSTOLIC BLOOD PRESSURE: 134 MMHG | RESPIRATION RATE: 18 BRPM | WEIGHT: 223 LBS

## 2024-10-02 DIAGNOSIS — K64.1 GRADE II HEMORRHOIDS: ICD-10-CM

## 2024-10-02 DIAGNOSIS — K58.1 IRRITABLE BOWEL SYNDROME WITH CONSTIPATION: ICD-10-CM

## 2024-10-02 PROCEDURE — 46221 LIGATION OF HEMORRHOID(S): CPT | Performed by: INTERNAL MEDICINE

## 2024-10-02 RX ORDER — EVOLOCUMAB 140 MG/ML
AS DIRECTED INJECTION, SOLUTION SUBCUTANEOUS
Status: ACTIVE | COMMUNITY

## 2024-10-02 RX ORDER — ALPRAZOLAM 1 MG/1
1 TABLET TABLET ORAL ONCE DAILY
Status: ACTIVE | COMMUNITY

## 2024-10-02 RX ORDER — DICYCLOMINE HYDROCHLORIDE 10 MG/1
1 CAPSULE CAPSULE ORAL
Qty: 90 | Refills: 1 | OUTPATIENT
Start: 2024-10-02 | End: 2024-11-30

## 2024-10-02 RX ORDER — BRIMONIDINE TARTRATE AND TIMOLOL MALEATE 2; 5 MG/ML; MG/ML
1 DROP INTO AFFECTED EYE SOLUTION/ DROPS OPHTHALMIC TWICE A DAY
Status: ACTIVE | COMMUNITY

## 2024-10-02 RX ORDER — DICYCLOMINE HYDROCHLORIDE 10 MG/1
1 CAPSULE CAPSULE ORAL
Qty: 90 | Refills: 1 | OUTPATIENT

## 2024-10-02 RX ORDER — LATANOPROST/PF 0.005 %
1 DROP INTO AFFECTED EYE IN THE EVENING DROPS OPHTHALMIC (EYE) ONCE A DAY
Refills: 0 | Status: ACTIVE | COMMUNITY
Start: 2019-07-17

## 2024-10-02 RX ORDER — SITAGLIPTIN AND METFORMIN HYDROCHLORIDE 100; 1000 MG/1; MG/1
1 TABLET WITH EVENING MEAL TABLET, FILM COATED, EXTENDED RELEASE ORAL ONCE A DAY
Status: ACTIVE | COMMUNITY

## 2024-10-02 RX ORDER — COLCHICINE 0.6 MG/1
1 TABLET TABLET, FILM COATED ORAL ONCE A DAY
Refills: 0 | Status: ACTIVE | COMMUNITY

## 2024-10-02 RX ORDER — ETANERCEPT 50 MG/ML
1 ML SOLUTION SUBCUTANEOUS WEEKLY
Status: ACTIVE | COMMUNITY

## 2024-10-02 RX ORDER — POTASSIUM CHLORIDE 1500 MG/1
1 TABLET WITH FOOD TABLET, EXTENDED RELEASE ORAL ONCE A DAY
Status: ACTIVE | COMMUNITY

## 2024-10-02 RX ORDER — CARVEDILOL 3.12 MG/1
1 TABLET WITH FOOD TABLET, FILM COATED ORAL TWICE A DAY
Status: ACTIVE | COMMUNITY

## 2024-10-02 RX ORDER — BUMETANIDE 2 MG/1
1 TABLET TABLET ORAL ONCE A DAY
Status: ACTIVE | COMMUNITY

## 2024-10-02 RX ORDER — FLUTICASONE FUROATE, UMECLIDINIUM BROMIDE AND VILANTEROL TRIFENATATE 100; 62.5; 25 UG/1; UG/1; UG/1
1 PUFF POWDER RESPIRATORY (INHALATION) ONCE A DAY
Status: ACTIVE | COMMUNITY

## 2024-10-02 RX ORDER — GARLIC 1000 MG
1 CAPSULE CAPSULE ORAL DAILY
Status: ACTIVE | COMMUNITY

## 2024-10-02 RX ORDER — POLYETHYLENE GLYCOL 400 AND PROPYLENE GLYCOL 4; 3 MG/ML; MG/ML
APPLY TO AFFECTED EYE GEL OPHTHALMIC
Status: ACTIVE | COMMUNITY

## 2024-10-02 RX ORDER — ALBUTEROL SULFATE 90 UG/1
INHALE 1 PUFF EVERY 4 HOURS AS NEEDED AEROSOL, METERED RESPIRATORY (INHALATION)
Refills: 0 | Status: ACTIVE | COMMUNITY
Start: 2018-06-20

## 2024-10-02 RX ORDER — DICYCLOMINE HYDROCHLORIDE 10 MG/1
1 CAPSULES CAPSULE ORAL
Qty: 368 | Refills: 3 | OUTPATIENT

## 2024-10-02 RX ORDER — BUSPIRONE HYDROCHLORIDE 10 MG/1
1 TABLET TABLET ORAL TWICE A DAY
Status: ACTIVE | COMMUNITY

## 2024-10-02 NOTE — HPI-TODAY'S VISIT:
Ms. Bear is a 79-year-old woman with a history of obesity, insulin-dependent diabetes and rheumatoid arthritis on Enbrel presenting for follow up regarding internal hemrrhoid banding of symptomatic internal hemorrhoids.  She was last seen on 9/9/2024 for symptomatic internal hemorrhoids status post band ligation of grade 2 right anterior internal hemorrhoid column.  She reports tolerating the banding procedure well.  She feels like the banding has been helpful.  She has not had any blood per rectum, no perianal pain or itching and decreased blood per rectum.  She reports that her constipation has been fairly well-controlled but she continues to experience periodic episodes of lower abdominal cramping pain improved taking dicyclomine.  She requests a refill of her dicyclomine

## 2024-11-06 ENCOUNTER — OFFICE VISIT (OUTPATIENT)
Dept: URBAN - METROPOLITAN AREA CLINIC 113 | Facility: CLINIC | Age: 79
End: 2024-11-06

## 2024-11-20 ENCOUNTER — OFFICE VISIT (OUTPATIENT)
Dept: URBAN - METROPOLITAN AREA CLINIC 113 | Facility: CLINIC | Age: 79
End: 2024-11-20

## 2024-11-20 RX ORDER — BUMETANIDE 2 MG/1
1 TABLET TABLET ORAL ONCE A DAY
Status: ACTIVE | COMMUNITY

## 2024-11-20 RX ORDER — DICYCLOMINE HYDROCHLORIDE 10 MG/1
1 CAPSULES CAPSULE ORAL
Qty: 368 | Refills: 3 | Status: ACTIVE | COMMUNITY

## 2024-11-20 RX ORDER — POTASSIUM CHLORIDE 1500 MG/1
1 TABLET WITH FOOD TABLET, EXTENDED RELEASE ORAL ONCE A DAY
Status: ACTIVE | COMMUNITY

## 2024-11-20 RX ORDER — ALBUTEROL SULFATE 90 UG/1
INHALE 1 PUFF EVERY 4 HOURS AS NEEDED AEROSOL, METERED RESPIRATORY (INHALATION)
Refills: 0 | Status: ACTIVE | COMMUNITY
Start: 2018-06-20

## 2024-11-20 RX ORDER — COLCHICINE 0.6 MG/1
1 TABLET TABLET, FILM COATED ORAL ONCE A DAY
Refills: 0 | Status: ACTIVE | COMMUNITY

## 2024-11-20 RX ORDER — SITAGLIPTIN AND METFORMIN HYDROCHLORIDE 100; 1000 MG/1; MG/1
1 TABLET WITH EVENING MEAL TABLET, FILM COATED, EXTENDED RELEASE ORAL ONCE A DAY
Status: ACTIVE | COMMUNITY

## 2024-11-20 RX ORDER — EVOLOCUMAB 140 MG/ML
AS DIRECTED INJECTION, SOLUTION SUBCUTANEOUS
Status: ACTIVE | COMMUNITY

## 2024-11-20 RX ORDER — LATANOPROST/PF 0.005 %
1 DROP INTO AFFECTED EYE IN THE EVENING DROPS OPHTHALMIC (EYE) ONCE A DAY
Refills: 0 | Status: ACTIVE | COMMUNITY
Start: 2019-07-17

## 2024-11-20 RX ORDER — ETANERCEPT 50 MG/ML
1 ML SOLUTION SUBCUTANEOUS WEEKLY
Status: ACTIVE | COMMUNITY

## 2024-11-20 RX ORDER — BRIMONIDINE TARTRATE AND TIMOLOL MALEATE 2; 5 MG/ML; MG/ML
1 DROP INTO AFFECTED EYE SOLUTION/ DROPS OPHTHALMIC TWICE A DAY
Status: ACTIVE | COMMUNITY

## 2024-11-20 RX ORDER — BUSPIRONE HYDROCHLORIDE 10 MG/1
1 TABLET TABLET ORAL TWICE A DAY
Status: ACTIVE | COMMUNITY

## 2024-11-20 RX ORDER — FLUTICASONE FUROATE, UMECLIDINIUM BROMIDE AND VILANTEROL TRIFENATATE 100; 62.5; 25 UG/1; UG/1; UG/1
1 PUFF POWDER RESPIRATORY (INHALATION) ONCE A DAY
Status: ACTIVE | COMMUNITY

## 2024-11-20 RX ORDER — POLYETHYLENE GLYCOL 400 AND PROPYLENE GLYCOL 4; 3 MG/ML; MG/ML
APPLY TO AFFECTED EYE GEL OPHTHALMIC
Status: ACTIVE | COMMUNITY

## 2024-11-20 RX ORDER — GARLIC 1000 MG
1 CAPSULE CAPSULE ORAL DAILY
Status: ACTIVE | COMMUNITY

## 2024-11-20 RX ORDER — CARVEDILOL 3.12 MG/1
1 TABLET WITH FOOD TABLET, FILM COATED ORAL TWICE A DAY
Status: ACTIVE | COMMUNITY

## 2024-11-20 RX ORDER — ALPRAZOLAM 1 MG/1
1 TABLET TABLET ORAL ONCE DAILY
Status: ACTIVE | COMMUNITY

## 2024-12-18 ENCOUNTER — OFFICE VISIT (OUTPATIENT)
Dept: URBAN - METROPOLITAN AREA CLINIC 113 | Facility: CLINIC | Age: 79
End: 2024-12-18

## 2024-12-18 VITALS
TEMPERATURE: 97.5 F | WEIGHT: 224.6 LBS | BODY MASS INDEX: 41.33 KG/M2 | RESPIRATION RATE: 20 BRPM | HEIGHT: 62 IN | DIASTOLIC BLOOD PRESSURE: 90 MMHG | HEART RATE: 86 BPM | SYSTOLIC BLOOD PRESSURE: 144 MMHG

## 2024-12-18 RX ORDER — SITAGLIPTIN AND METFORMIN HYDROCHLORIDE 100; 1000 MG/1; MG/1
1 TABLET WITH EVENING MEAL TABLET, FILM COATED, EXTENDED RELEASE ORAL ONCE A DAY
Status: ACTIVE | COMMUNITY

## 2024-12-18 RX ORDER — POLYETHYLENE GLYCOL 400 AND PROPYLENE GLYCOL 4; 3 MG/ML; MG/ML
APPLY TO AFFECTED EYE GEL OPHTHALMIC
Status: ACTIVE | COMMUNITY

## 2024-12-18 RX ORDER — BUMETANIDE 2 MG/1
1 TABLET TABLET ORAL ONCE A DAY
Status: ACTIVE | COMMUNITY

## 2024-12-18 RX ORDER — ETANERCEPT 50 MG/ML
1 ML SOLUTION SUBCUTANEOUS WEEKLY
Status: ACTIVE | COMMUNITY

## 2024-12-18 RX ORDER — ALPRAZOLAM 1 MG/1
1 TABLET TABLET ORAL ONCE DAILY
Status: ACTIVE | COMMUNITY

## 2024-12-18 RX ORDER — EVOLOCUMAB 140 MG/ML
AS DIRECTED INJECTION, SOLUTION SUBCUTANEOUS
Status: ACTIVE | COMMUNITY

## 2024-12-18 RX ORDER — GARLIC 1000 MG
1 CAPSULE CAPSULE ORAL DAILY
Status: ACTIVE | COMMUNITY

## 2024-12-18 RX ORDER — ALBUTEROL SULFATE 90 UG/1
INHALE 1 PUFF EVERY 4 HOURS AS NEEDED AEROSOL, METERED RESPIRATORY (INHALATION)
Refills: 0 | Status: ACTIVE | COMMUNITY
Start: 2018-06-20

## 2024-12-18 RX ORDER — LATANOPROST/PF 0.005 %
1 DROP INTO AFFECTED EYE IN THE EVENING DROPS OPHTHALMIC (EYE) ONCE A DAY
Refills: 0 | Status: ACTIVE | COMMUNITY
Start: 2019-07-17

## 2024-12-18 RX ORDER — DICYCLOMINE HYDROCHLORIDE 10 MG/1
1 CAPSULES CAPSULE ORAL
Qty: 368 | Refills: 3 | Status: ACTIVE | COMMUNITY

## 2024-12-18 RX ORDER — BUSPIRONE HYDROCHLORIDE 10 MG/1
1 TABLET TABLET ORAL TWICE A DAY
Status: ACTIVE | COMMUNITY

## 2024-12-18 RX ORDER — POTASSIUM CHLORIDE 1500 MG/1
1 TABLET WITH FOOD TABLET, EXTENDED RELEASE ORAL ONCE A DAY
Status: ACTIVE | COMMUNITY

## 2024-12-18 RX ORDER — BRIMONIDINE TARTRATE AND TIMOLOL MALEATE 2; 5 MG/ML; MG/ML
1 DROP INTO AFFECTED EYE SOLUTION/ DROPS OPHTHALMIC TWICE A DAY
Status: ACTIVE | COMMUNITY

## 2024-12-18 RX ORDER — FLUTICASONE FUROATE, UMECLIDINIUM BROMIDE AND VILANTEROL TRIFENATATE 100; 62.5; 25 UG/1; UG/1; UG/1
1 PUFF POWDER RESPIRATORY (INHALATION) ONCE A DAY
Status: ACTIVE | COMMUNITY

## 2024-12-18 RX ORDER — CARVEDILOL 3.12 MG/1
1 TABLET WITH FOOD TABLET, FILM COATED ORAL TWICE A DAY
Status: ACTIVE | COMMUNITY

## 2024-12-18 RX ORDER — COLCHICINE 0.6 MG/1
1 TABLET TABLET, FILM COATED ORAL ONCE A DAY
Refills: 0 | Status: ACTIVE | COMMUNITY

## 2024-12-18 NOTE — HPI-TODAY'S VISIT:
Ms. Bear is a 79-year-old woman with a history of obesity, insulin-dependent diabetes and rheumatoid arthritis on Enbrel presenting for follow up regarding internal hemrrhoid banding of symptomatic internal hemorrhoids.

## 2024-12-20 ENCOUNTER — TELEPHONE ENCOUNTER (OUTPATIENT)
Dept: URBAN - METROPOLITAN AREA CLINIC 113 | Facility: CLINIC | Age: 79
End: 2024-12-20

## 2025-01-29 ENCOUNTER — OFFICE VISIT (OUTPATIENT)
Dept: URBAN - METROPOLITAN AREA CLINIC 113 | Facility: CLINIC | Age: 80
End: 2025-01-29

## 2025-02-17 ENCOUNTER — OFFICE VISIT (OUTPATIENT)
Dept: URBAN - METROPOLITAN AREA CLINIC 113 | Facility: CLINIC | Age: 80
End: 2025-02-17

## 2025-02-17 VITALS
SYSTOLIC BLOOD PRESSURE: 128 MMHG | HEART RATE: 81 BPM | DIASTOLIC BLOOD PRESSURE: 79 MMHG | TEMPERATURE: 97.3 F | WEIGHT: 225 LBS | BODY MASS INDEX: 41.41 KG/M2 | RESPIRATION RATE: 18 BRPM | HEIGHT: 62 IN

## 2025-02-17 RX ORDER — CARVEDILOL 3.12 MG/1
1 TABLET WITH FOOD TABLET, FILM COATED ORAL TWICE A DAY
Status: ACTIVE | COMMUNITY

## 2025-02-17 RX ORDER — POLYETHYLENE GLYCOL 400 AND PROPYLENE GLYCOL 4; 3 MG/ML; MG/ML
APPLY TO AFFECTED EYE GEL OPHTHALMIC
Status: ACTIVE | COMMUNITY

## 2025-02-17 RX ORDER — FLUTICASONE FUROATE, UMECLIDINIUM BROMIDE AND VILANTEROL TRIFENATATE 100; 62.5; 25 UG/1; UG/1; UG/1
1 PUFF POWDER RESPIRATORY (INHALATION) ONCE A DAY
Status: ACTIVE | COMMUNITY

## 2025-02-17 RX ORDER — BUSPIRONE HYDROCHLORIDE 10 MG/1
1 TABLET TABLET ORAL TWICE A DAY
Status: ACTIVE | COMMUNITY

## 2025-02-17 RX ORDER — COLCHICINE 0.6 MG/1
1 TABLET TABLET, FILM COATED ORAL ONCE A DAY
Refills: 0 | Status: ACTIVE | COMMUNITY

## 2025-02-17 RX ORDER — SITAGLIPTIN AND METFORMIN HYDROCHLORIDE 100; 1000 MG/1; MG/1
1 TABLET WITH EVENING MEAL TABLET, FILM COATED, EXTENDED RELEASE ORAL ONCE A DAY
Status: ACTIVE | COMMUNITY

## 2025-02-17 RX ORDER — POTASSIUM CHLORIDE 1500 MG/1
1 TABLET WITH FOOD TABLET, EXTENDED RELEASE ORAL ONCE A DAY
Status: ACTIVE | COMMUNITY

## 2025-02-17 RX ORDER — ALBUTEROL SULFATE 90 UG/1
INHALE 1 PUFF EVERY 4 HOURS AS NEEDED AEROSOL, METERED RESPIRATORY (INHALATION)
Refills: 0 | Status: ACTIVE | COMMUNITY
Start: 2018-06-20

## 2025-02-17 RX ORDER — ETANERCEPT 50 MG/ML
1 ML SOLUTION SUBCUTANEOUS WEEKLY
Status: ACTIVE | COMMUNITY

## 2025-02-17 RX ORDER — ALPRAZOLAM 1 MG/1
1 TABLET TABLET ORAL ONCE DAILY
Status: ACTIVE | COMMUNITY

## 2025-02-17 RX ORDER — BUMETANIDE 2 MG/1
1 TABLET TABLET ORAL ONCE A DAY
Status: ACTIVE | COMMUNITY

## 2025-02-17 RX ORDER — BRIMONIDINE TARTRATE AND TIMOLOL MALEATE 2; 5 MG/ML; MG/ML
1 DROP INTO AFFECTED EYE SOLUTION/ DROPS OPHTHALMIC TWICE A DAY
Status: ACTIVE | COMMUNITY

## 2025-02-17 RX ORDER — EVOLOCUMAB 140 MG/ML
AS DIRECTED INJECTION, SOLUTION SUBCUTANEOUS
Status: ACTIVE | COMMUNITY

## 2025-02-17 RX ORDER — DICYCLOMINE HYDROCHLORIDE 10 MG/1
1 CAPSULES CAPSULE ORAL
Qty: 368 | Refills: 3 | Status: ACTIVE | COMMUNITY

## 2025-02-17 RX ORDER — GARLIC 1000 MG
1 CAPSULE CAPSULE ORAL DAILY
Status: ACTIVE | COMMUNITY

## 2025-02-17 RX ORDER — LATANOPROST/PF 0.005 %
1 DROP INTO AFFECTED EYE IN THE EVENING DROPS OPHTHALMIC (EYE) ONCE A DAY
Refills: 0 | Status: ACTIVE | COMMUNITY
Start: 2019-07-17

## 2025-03-17 ENCOUNTER — OFFICE VISIT (OUTPATIENT)
Dept: URBAN - METROPOLITAN AREA CLINIC 113 | Facility: CLINIC | Age: 80
End: 2025-03-17

## 2025-03-19 ENCOUNTER — OFFICE VISIT (OUTPATIENT)
Dept: URBAN - METROPOLITAN AREA CLINIC 113 | Facility: CLINIC | Age: 80
End: 2025-03-19

## 2025-03-19 RX ORDER — DICYCLOMINE HYDROCHLORIDE 10 MG/1
1 CAPSULES CAPSULE ORAL
Qty: 368 | Refills: 3 | Status: ACTIVE | COMMUNITY

## 2025-03-19 RX ORDER — ALPRAZOLAM 1 MG/1
1 TABLET TABLET ORAL ONCE DAILY
Status: ACTIVE | COMMUNITY

## 2025-03-19 RX ORDER — BUMETANIDE 2 MG/1
1 TABLET TABLET ORAL ONCE A DAY
Status: ACTIVE | COMMUNITY

## 2025-03-19 RX ORDER — POLYETHYLENE GLYCOL 400 AND PROPYLENE GLYCOL 4; 3 MG/ML; MG/ML
APPLY TO AFFECTED EYE GEL OPHTHALMIC
Status: ACTIVE | COMMUNITY

## 2025-03-19 RX ORDER — COLCHICINE 0.6 MG/1
1 TABLET TABLET, FILM COATED ORAL ONCE A DAY
Refills: 0 | Status: ACTIVE | COMMUNITY

## 2025-03-19 RX ORDER — BRIMONIDINE TARTRATE AND TIMOLOL MALEATE 2; 5 MG/ML; MG/ML
1 DROP INTO AFFECTED EYE SOLUTION/ DROPS OPHTHALMIC TWICE A DAY
Status: ACTIVE | COMMUNITY

## 2025-03-19 RX ORDER — EVOLOCUMAB 140 MG/ML
AS DIRECTED INJECTION, SOLUTION SUBCUTANEOUS
Status: ACTIVE | COMMUNITY

## 2025-03-19 RX ORDER — LATANOPROST/PF 0.005 %
1 DROP INTO AFFECTED EYE IN THE EVENING DROPS OPHTHALMIC (EYE) ONCE A DAY
Refills: 0 | Status: ACTIVE | COMMUNITY
Start: 2019-07-17

## 2025-03-19 RX ORDER — POTASSIUM CHLORIDE 1500 MG/1
1 TABLET WITH FOOD TABLET, EXTENDED RELEASE ORAL ONCE A DAY
Status: ACTIVE | COMMUNITY

## 2025-03-19 RX ORDER — FLUTICASONE FUROATE, UMECLIDINIUM BROMIDE AND VILANTEROL TRIFENATATE 100; 62.5; 25 UG/1; UG/1; UG/1
1 PUFF POWDER RESPIRATORY (INHALATION) ONCE A DAY
Status: ACTIVE | COMMUNITY

## 2025-03-19 RX ORDER — SITAGLIPTIN AND METFORMIN HYDROCHLORIDE 100; 1000 MG/1; MG/1
1 TABLET WITH EVENING MEAL TABLET, FILM COATED, EXTENDED RELEASE ORAL ONCE A DAY
Status: ACTIVE | COMMUNITY

## 2025-03-19 RX ORDER — CARVEDILOL 3.12 MG/1
1 TABLET WITH FOOD TABLET, FILM COATED ORAL TWICE A DAY
Status: ACTIVE | COMMUNITY

## 2025-03-19 RX ORDER — ETANERCEPT 50 MG/ML
1 ML SOLUTION SUBCUTANEOUS WEEKLY
Status: ACTIVE | COMMUNITY

## 2025-03-19 RX ORDER — ALBUTEROL SULFATE 90 UG/1
INHALE 1 PUFF EVERY 4 HOURS AS NEEDED AEROSOL, METERED RESPIRATORY (INHALATION)
Refills: 0 | Status: ACTIVE | COMMUNITY
Start: 2018-06-20

## 2025-03-19 RX ORDER — BUSPIRONE HYDROCHLORIDE 10 MG/1
1 TABLET TABLET ORAL TWICE A DAY
Status: ACTIVE | COMMUNITY

## 2025-03-19 RX ORDER — GARLIC 1000 MG
1 CAPSULE CAPSULE ORAL DAILY
Status: ACTIVE | COMMUNITY

## 2025-03-19 NOTE — HPI-TODAY'S VISIT:
Ms. Bear is a 79-year-old woman with a history of obesity, insulin-dependent diabetes and rheumatoid arthritis on Humira presenting for follow up regarding internal hemrrhoid banding of symptomatic internal hemorrhoids.  She underwent internal hemorrhoid banding of the right anterior column on 9/9/2024, left lateral internal hemorrhoid column on 10/2/2024, repeat band ligation of the right anterior hemorrhoid column on 12/18/2024, and left lateral internal hemorrhoid coloumn on 2/17.

## 2025-04-16 ENCOUNTER — OFFICE VISIT (OUTPATIENT)
Dept: URBAN - METROPOLITAN AREA CLINIC 113 | Facility: CLINIC | Age: 80
End: 2025-04-16

## 2025-05-14 ENCOUNTER — OFFICE VISIT (OUTPATIENT)
Dept: URBAN - METROPOLITAN AREA CLINIC 113 | Facility: CLINIC | Age: 80
End: 2025-05-14

## 2025-06-19 ENCOUNTER — TELEPHONE ENCOUNTER (OUTPATIENT)
Dept: URBAN - METROPOLITAN AREA CLINIC 113 | Facility: CLINIC | Age: 80
End: 2025-06-19

## 2025-06-25 ENCOUNTER — OFFICE VISIT (OUTPATIENT)
Dept: URBAN - METROPOLITAN AREA CLINIC 113 | Facility: CLINIC | Age: 80
End: 2025-06-25

## 2025-06-25 RX ORDER — GARLIC 1000 MG
1 CAPSULE CAPSULE ORAL DAILY
Status: ACTIVE | COMMUNITY

## 2025-06-25 RX ORDER — FLUTICASONE FUROATE, UMECLIDINIUM BROMIDE AND VILANTEROL TRIFENATATE 100; 62.5; 25 UG/1; UG/1; UG/1
1 PUFF POWDER RESPIRATORY (INHALATION) ONCE A DAY
Status: ACTIVE | COMMUNITY

## 2025-06-25 RX ORDER — COLCHICINE 0.6 MG/1
1 TABLET TABLET, FILM COATED ORAL ONCE A DAY
Refills: 0 | Status: ACTIVE | COMMUNITY

## 2025-06-25 RX ORDER — CARVEDILOL 3.12 MG/1
1 TABLET WITH FOOD TABLET, FILM COATED ORAL TWICE A DAY
Status: ACTIVE | COMMUNITY

## 2025-06-25 RX ORDER — HYDROCORTISONE ACETATE 25 MG/1
1 SUPPOSITORY SUPPOSITORY RECTAL ONCE A DAY
Qty: 30 | OUTPATIENT
Start: 2025-06-25 | End: 2025-07-25

## 2025-06-25 RX ORDER — ETANERCEPT 50 MG/ML
1 ML SOLUTION SUBCUTANEOUS WEEKLY
Status: ACTIVE | COMMUNITY

## 2025-06-25 RX ORDER — BUSPIRONE HYDROCHLORIDE 10 MG/1
1 TABLET TABLET ORAL TWICE A DAY
Status: ACTIVE | COMMUNITY

## 2025-06-25 RX ORDER — LATANOPROST/PF 0.005 %
1 DROP INTO AFFECTED EYE IN THE EVENING DROPS OPHTHALMIC (EYE) ONCE A DAY
Refills: 0 | Status: ACTIVE | COMMUNITY
Start: 2019-07-17

## 2025-06-25 RX ORDER — DICYCLOMINE HYDROCHLORIDE 10 MG/1
1 CAPSULES CAPSULE ORAL
Qty: 368 | Refills: 3 | Status: ACTIVE | COMMUNITY

## 2025-06-25 RX ORDER — ALBUTEROL SULFATE 90 UG/1
INHALE 1 PUFF EVERY 4 HOURS AS NEEDED AEROSOL, METERED RESPIRATORY (INHALATION)
Refills: 0 | Status: ACTIVE | COMMUNITY
Start: 2018-06-20

## 2025-06-25 RX ORDER — POTASSIUM CHLORIDE 1500 MG/1
1 TABLET WITH FOOD TABLET, EXTENDED RELEASE ORAL ONCE A DAY
Status: ACTIVE | COMMUNITY

## 2025-06-25 RX ORDER — BUMETANIDE 2 MG/1
1 TABLET TABLET ORAL ONCE A DAY
Status: ACTIVE | COMMUNITY

## 2025-06-25 RX ORDER — BRIMONIDINE TARTRATE AND TIMOLOL MALEATE 2; 5 MG/ML; MG/ML
1 DROP INTO AFFECTED EYE SOLUTION/ DROPS OPHTHALMIC TWICE A DAY
Status: ACTIVE | COMMUNITY

## 2025-06-25 RX ORDER — ALPRAZOLAM 1 MG/1
1 TABLET TABLET ORAL ONCE DAILY
Status: ACTIVE | COMMUNITY

## 2025-06-25 RX ORDER — POLYETHYLENE GLYCOL 400 AND PROPYLENE GLYCOL 4; 3 MG/ML; MG/ML
APPLY TO AFFECTED EYE GEL OPHTHALMIC
Status: ACTIVE | COMMUNITY

## 2025-06-25 RX ORDER — EVOLOCUMAB 140 MG/ML
AS DIRECTED INJECTION, SOLUTION SUBCUTANEOUS
Status: ACTIVE | COMMUNITY

## 2025-06-25 RX ORDER — SITAGLIPTIN AND METFORMIN HYDROCHLORIDE 100; 1000 MG/1; MG/1
1 TABLET WITH EVENING MEAL TABLET, FILM COATED, EXTENDED RELEASE ORAL ONCE A DAY
Status: ACTIVE | COMMUNITY

## 2025-06-25 NOTE — HPI-TODAY'S VISIT:
Ms. Bear is a 80-year-old woman with a history of obesity, insulin-dependent diabetes and rheumatoid arthritis on Humira presenting for follow up regarding internal hemrrhoid banding of symptomatic internal hemorrhoids.  She underwent internal hemorrhoid banding of the right anterior column on 9/9/2024, left lateral internal hemorrhoid column on 10/2/2024, repeat band ligation of the right anterior hemorrhoid column on 12/18/2024, and left lateral internal hemorrhoid coloumn on 2/17.

## 2025-06-30 ENCOUNTER — TELEPHONE ENCOUNTER (OUTPATIENT)
Dept: URBAN - METROPOLITAN AREA CLINIC 113 | Facility: CLINIC | Age: 80
End: 2025-06-30

## 2025-08-26 ENCOUNTER — OFFICE VISIT (OUTPATIENT)
Dept: URBAN - METROPOLITAN AREA CLINIC 113 | Facility: CLINIC | Age: 80
End: 2025-08-26

## 2025-08-26 RX ORDER — BUSPIRONE HYDROCHLORIDE 10 MG/1
1 TABLET TABLET ORAL TWICE A DAY
Status: ACTIVE | COMMUNITY

## 2025-08-26 RX ORDER — BRIMONIDINE TARTRATE AND TIMOLOL MALEATE 2; 5 MG/ML; MG/ML
1 DROP INTO AFFECTED EYE SOLUTION/ DROPS OPHTHALMIC TWICE A DAY
Status: ACTIVE | COMMUNITY

## 2025-08-26 RX ORDER — ALPRAZOLAM 1 MG/1
1 TABLET TABLET ORAL ONCE DAILY
Status: ACTIVE | COMMUNITY

## 2025-08-26 RX ORDER — GARLIC 1000 MG
1 CAPSULE CAPSULE ORAL DAILY
Status: ACTIVE | COMMUNITY

## 2025-08-26 RX ORDER — COLCHICINE 0.6 MG/1
1 TABLET TABLET ORAL ONCE A DAY
Refills: 0 | Status: ACTIVE | COMMUNITY

## 2025-08-26 RX ORDER — EVOLOCUMAB 140 MG/ML
AS DIRECTED INJECTION, SOLUTION SUBCUTANEOUS
Status: ACTIVE | COMMUNITY

## 2025-08-26 RX ORDER — DICYCLOMINE HYDROCHLORIDE 10 MG/1
1 CAPSULES CAPSULE ORAL
Qty: 368 | Refills: 3 | Status: ACTIVE | COMMUNITY

## 2025-08-26 RX ORDER — ETANERCEPT 50 MG/ML
1 ML SOLUTION SUBCUTANEOUS WEEKLY
Status: ACTIVE | COMMUNITY

## 2025-08-26 RX ORDER — FLUTICASONE FUROATE, UMECLIDINIUM BROMIDE AND VILANTEROL TRIFENATATE 100; 62.5; 25 UG/1; UG/1; UG/1
1 PUFF POWDER RESPIRATORY (INHALATION) ONCE A DAY
Status: ACTIVE | COMMUNITY

## 2025-08-26 RX ORDER — POLYETHYLENE GLYCOL 400 AND PROPYLENE GLYCOL 4; 3 MG/ML; MG/ML
APPLY TO AFFECTED EYE GEL OPHTHALMIC
Status: ACTIVE | COMMUNITY

## 2025-08-26 RX ORDER — ALBUTEROL SULFATE 90 UG/1
INHALE 1 PUFF EVERY 4 HOURS AS NEEDED AEROSOL, METERED RESPIRATORY (INHALATION)
Refills: 0 | Status: ACTIVE | COMMUNITY
Start: 2018-06-20

## 2025-08-26 RX ORDER — POTASSIUM CHLORIDE 1500 MG/1
1 TABLET WITH FOOD TABLET, EXTENDED RELEASE ORAL ONCE A DAY
Status: ACTIVE | COMMUNITY

## 2025-08-26 RX ORDER — BUMETANIDE 2 MG/1
1 TABLET TABLET ORAL ONCE A DAY
Status: ACTIVE | COMMUNITY

## 2025-08-26 RX ORDER — CARVEDILOL 3.12 MG/1
1 TABLET WITH FOOD TABLET, FILM COATED ORAL TWICE A DAY
Status: ACTIVE | COMMUNITY

## 2025-08-26 RX ORDER — LATANOPROST/PF 0.005 %
1 DROP INTO AFFECTED EYE IN THE EVENING DROPS OPHTHALMIC (EYE) ONCE A DAY
Refills: 0 | Status: ACTIVE | COMMUNITY
Start: 2019-07-17

## 2025-08-26 RX ORDER — SITAGLIPTIN AND METFORMIN HYDROCHLORIDE 100; 1000 MG/1; MG/1
1 TABLET WITH EVENING MEAL TABLET, FILM COATED, EXTENDED RELEASE ORAL ONCE A DAY
Status: ACTIVE | COMMUNITY